# Patient Record
Sex: FEMALE | Race: WHITE | URBAN - METROPOLITAN AREA
[De-identification: names, ages, dates, MRNs, and addresses within clinical notes are randomized per-mention and may not be internally consistent; named-entity substitution may affect disease eponyms.]

---

## 2019-10-07 LAB — MAMMOGRAPHY, EXTERNAL: NORMAL

## 2019-11-17 NOTE — PROGRESS NOTES
History of Present Illness   Chief Complaint   Establish care    Cheng Espinal is a 81 y.o. female     Needed PCP closer to home. Jaw painpatient reports that she previously had this pain but in the past 2 weeks or so she has had worsening right-sided jaw pain. Feels like she cannot open her mouth fully. Difficulty with chewing sometimes. Recently traveled and was wondering if maybe it is actually an ear issue. Recently bought a mouthguard and has been using that and has noticed some mild improvement in her jaw pain. Feels like the pain is worse in the morning. Not sure if she grinds at night. Concerned about high blood pressurepatient has a lots of stressful situations going on this past year. Wondering if she should be taking her blood pressure at home. Denies any chest pain, shortness of breath, headache, lightheadedness, dizziness. Breast cancer 2-1/2 years out from remission. Followed by an oncologist in [place]. Initially diagnosed as stage II. Underwent chemo and radiation. Status post 2 lumpectomies. Denies any recent issues    Hyperlipidemiadenies any side effects to medications    Allergic rhinitistakes allergy shots, Claritin, Flonase    Asthmatakes level albuterol as needed. Denies any shortness of breath or wheezing. Reports symptoms are currently controlled. Depressiontaking Zoloft. Has been on medications for the past 25 years. Well-controlled at this time    Insomniahas been taking Ambien for the past 25 years as well    GERDwell controlled with Prilosec    Arthritistakes Mobic as needed    Review of Systems   Constitutional: Negative for chills and fever. HENT: Negative for hearing loss. Eyes: Negative for blurred vision. Respiratory: Negative for shortness of breath. Cardiovascular: Negative for chest pain. Gastrointestinal: Negative for abdominal pain, blood in stool, constipation, diarrhea, melena, nausea and vomiting.    Genitourinary: Negative for dysuria and hematuria. Musculoskeletal: Negative for joint pain. Skin: Negative for rash. Neurological: Negative for headaches. Past Medical History     Allergies   Allergen Reactions    Prednisone Anaphylaxis and Shortness of Breath     Other reaction(s): anaphylaxis/angioedema      Cefazolin Cough and Itching     Other reaction(s): cough  Other reaction(s): Itching  Other reaction(s): Itching      Other Medication Other (comments)     Other reaction(s): unknown  Increased liver function    Statins-Hmg-Coa Reductase Inhibitors Other (comments)     Muscle aches        Current Outpatient Medications   Medication Sig    exemestane (AROMASIN) 25 mg tablet Take 25 mg by mouth daily.  atorvastatin (LIPITOR) 10 mg tablet Take  by mouth daily.  Cetirizine (ZYRTEC) 10 mg cap Take  by mouth.  meloxicam (MOBIC) 15 mg tablet Take 15 mg by mouth daily.  sertraline (ZOLOFT) 50 mg tablet Take  by mouth daily.  zolpidem (AMBIEN) 5 mg tablet Take  by mouth nightly as needed for Sleep.  omeprazole (PRILOSEC) 20 mg capsule Take 20 mg by mouth daily.  levalbuterol tartrate (XOPENEX HFA IN) Take  by inhalation as needed.  nicotinic acid (NIACIN) 500 mg tablet Take 500 mg by mouth Daily (before breakfast).  krill/om-3/dha/epa/phospho/ast (MEGARED OMEGA-3 KRILL OIL PO) Take  by mouth.  acetylcysteine (N-ACETYL-L-CYSTEINE) Take 600 mg by mouth daily.  multivitamin (ONE A DAY) tablet Take 1 Tab by mouth daily.  calcium carbonate/vitamin D2 (LIQUID CALCIUM PO) Take  by mouth.  glucosamine-chondroitin (ARTHX) 500-400 mg cap Take 1 Cap by mouth daily.  cholecalciferol (VITAMIN D3) 1,000 unit cap Take  by mouth daily.  magnesium oxide (MAG-OX) 400 mg tablet Take 400 mg by mouth daily.  MILK THISTLE PO Take  by mouth.  BIOTIN PO Take  by mouth.  TURMERIC PO Take  by mouth. No current facility-administered medications for this visit.            There is no problem list on file for this patient. Past Surgical History:   Procedure Laterality Date    HX BREAST LUMPECTOMY      HX ORTHOPAEDIC      bilateral Thumbs    HX ORTHOPAEDIC      Left lower Leg with hardware    HX ORTHOPAEDIC      Ramirez Neuroma    HX TONSIL AND ADENOIDECTOMY      HX WISDOM TEETH EXTRACTION        Social History     Tobacco Use    Smoking status: Former Smoker     Packs/day: 1.50     Years: 23.00     Pack years: 34.50     Last attempt to quit:      Years since quittin.8    Smokeless tobacco: Never Used   Substance Use Topics    Alcohol use: Yes     Alcohol/week: 7.0 standard drinks     Types: 7 Cans of beer per week      Family History   Problem Relation Age of Onset    Heart Attack Mother 71        3V cabg    Diabetes Mother     Other Mother         cholesterol    Pancreatic Cancer Father 68    Diabetes Brother         complicated by leg amputation        Physical Exam   Vitals:       Visit Vitals  /78 (BP 1 Location: Left arm, BP Patient Position: Sitting)   Pulse 72   Temp 99 °F (37.2 °C) (Oral)   Resp 18   Ht 5' 2\" (1.575 m)   Wt 139 lb 2 oz (63.1 kg)   SpO2 97%   BMI 25.45 kg/m²        Physical Exam   Constitutional: She is oriented to person, place, and time and well-developed, well-nourished, and in no distress. No distress. HENT:   Right Ear: External ear normal.   Left Ear: External ear normal.   Mouth/Throat: Oropharynx is clear and moist.   Eyes: Conjunctivae and EOM are normal.   Neck: Neck supple. No thyromegaly present. Cardiovascular: Normal rate, regular rhythm and intact distal pulses. Exam reveals no gallop and no friction rub. No murmur heard. Pulmonary/Chest: No respiratory distress. She has no wheezes. She has no rales. Abdominal: Soft. Bowel sounds are normal. She exhibits no distension. There is no hepatosplenomegaly. There is no tenderness. Neurological: She is alert and oriented to person, place, and time. Skin: Skin is warm.  No rash noted. Psychiatric: Affect and judgment normal.        Assessment and Plan   Diagnoses and all orders for this visit:    1. Routine adult health maintenance  -     CBC WITH AUTOMATED DIFF; Future  -     HEMOGLOBIN A1C WITH EAG; Future  -     METABOLIC PANEL, COMPREHENSIVE; Future  -     LIPID PANEL; Future  -     HEPATITIS C AB; Future  Patient to see if she had a pneumonia shot already    2. Mild intermittent asthma without complication  Well-controlled    3. Arthritis  Well-controlled    4. Hypercholesterolemia  Check lipid panel today    5. History of breast cancer  Followed by oncology in [place]    6. Recurrent major depressive disorder, in remission [facility])  Assessment & Plan:    Key Psychotherapeutic Meds             sertraline (ZOLOFT) 50 mg tablet (Taking) Take  by mouth daily. zolpidem (AMBIEN) 5 mg tablet (Taking) Take  by mouth nightly as needed for Sleep. Other [address] [place] [address]     The patient is on no other behavioral health meds. No results found for: NA, NAPOC, CREA, CREAPOC, CREATEXT, TSH, WBC, WBCT, WBCPOC, GPT, ALTPOC, ALT, SGOT, ASTPOC, GGT, LITHM, ATRPA, NORTR, TSHEXT  Well-controlled, continue medications    7. Gastroesophageal reflux disease without esophagitis  Well-controlled, continue medications    8. Chronic rhinitis  Well-controlled, continue medications    9. Other insomnia  Well-controlled, continue medications    10. Jaw pain  Ear exam negative. Possible TMJ due to grinding at night? Recommend continuing mouthguard and following up with her dentist as scheduled in January    Benefits, risks, possible drug interactions, and side effects of all new medications were reviewed with the patient. Pt verbalized understanding. Return to clinic: 3 months for jaw, blood pressure (recommended taking her blood pressure at home)    Elise Pisano MD  [facility]  11/18/2019    No future appointments.

## 2019-11-18 ENCOUNTER — HOSPITAL ENCOUNTER (OUTPATIENT)
Dept: LAB | Age: 69
Discharge: HOME | End: 2019-11-18

## 2019-11-18 ENCOUNTER — OFFICE VISIT (OUTPATIENT)
Dept: INTERNAL MEDICINE CLINIC | Age: 69
End: 2019-11-18

## 2019-11-18 VITALS
WEIGHT: 139.13 LBS | TEMPERATURE: 99 F | HEART RATE: 72 BPM | OXYGEN SATURATION: 97 % | HEIGHT: 62 IN | SYSTOLIC BLOOD PRESSURE: 133 MMHG | BODY MASS INDEX: 25.6 KG/M2 | RESPIRATION RATE: 18 BRPM | DIASTOLIC BLOOD PRESSURE: 78 MMHG

## 2019-11-18 DIAGNOSIS — K21.9 GASTROESOPHAGEAL REFLUX DISEASE WITHOUT ESOPHAGITIS: ICD-10-CM

## 2019-11-18 DIAGNOSIS — E78.00 HYPERCHOLESTEROLEMIA: ICD-10-CM

## 2019-11-18 DIAGNOSIS — M19.90 ARTHRITIS: ICD-10-CM

## 2019-11-18 DIAGNOSIS — Z85.3 HISTORY OF BREAST CANCER: ICD-10-CM

## 2019-11-18 DIAGNOSIS — G47.09 OTHER INSOMNIA: ICD-10-CM

## 2019-11-18 DIAGNOSIS — Z00.00 ROUTINE ADULT HEALTH MAINTENANCE: ICD-10-CM

## 2019-11-18 DIAGNOSIS — J45.20 MILD INTERMITTENT ASTHMA WITHOUT COMPLICATION: ICD-10-CM

## 2019-11-18 DIAGNOSIS — Z23 ENCOUNTER FOR IMMUNIZATION: ICD-10-CM

## 2019-11-18 DIAGNOSIS — J31.0 CHRONIC RHINITIS: ICD-10-CM

## 2019-11-18 DIAGNOSIS — F33.40 RECURRENT MAJOR DEPRESSIVE DISORDER, IN REMISSION (HCC): ICD-10-CM

## 2019-11-18 DIAGNOSIS — R68.84 JAW PAIN: ICD-10-CM

## 2019-11-18 DIAGNOSIS — Z00.00 ROUTINE ADULT HEALTH MAINTENANCE: Primary | ICD-10-CM

## 2019-11-18 PROBLEM — F32.A DEPRESSION: Status: ACTIVE | Noted: 2019-11-18

## 2019-11-18 PROBLEM — J45.909 ASTHMA: Status: ACTIVE | Noted: 2019-11-18

## 2019-11-18 LAB
ALBUMIN SERPL-MCNC: 4 G/DL (ref 3.5–5)
ALBUMIN/GLOB SERPL: 1.5 {RATIO} (ref 1.1–2.2)
ALP SERPL-CCNC: 82 U/L (ref 45–117)
ALT SERPL-CCNC: 30 U/L (ref 12–78)
ANION GAP SERPL CALC-SCNC: 8 MMOL/L (ref 5–15)
AST SERPL-CCNC: 18 U/L (ref 15–37)
BASOPHILS # BLD: 0 K/UL (ref 0–0.1)
BASOPHILS NFR BLD: 1 % (ref 0–1)
BILIRUB SERPL-MCNC: 0.2 MG/DL (ref 0.2–1)
BUN SERPL-MCNC: 16 MG/DL (ref 6–20)
BUN/CREAT SERPL: 23 (ref 12–20)
CALCIUM SERPL-MCNC: 9.2 MG/DL (ref 8.5–10.1)
CHLORIDE SERPL-SCNC: 109 MMOL/L (ref 97–108)
CHOLEST SERPL-MCNC: 196 MG/DL
CO2 SERPL-SCNC: 24 MMOL/L (ref 21–32)
CREAT SERPL-MCNC: 0.7 MG/DL (ref 0.55–1.02)
DIFFERENTIAL METHOD BLD: NORMAL
EOSINOPHIL # BLD: 0.2 K/UL (ref 0–0.4)
EOSINOPHIL NFR BLD: 3 % (ref 0–7)
ERYTHROCYTE [DISTWIDTH] IN BLOOD BY AUTOMATED COUNT: 13 % (ref 11.5–14.5)
GLOBULIN SER CALC-MCNC: 2.7 G/DL (ref 2–4)
GLUCOSE SERPL-MCNC: 114 MG/DL (ref 65–100)
HCT VFR BLD AUTO: 42.4 % (ref 35–47)
HDLC SERPL-MCNC: 69 MG/DL
HDLC SERPL: 2.8 {RATIO} (ref 0–5)
HGB BLD-MCNC: 13.4 G/DL (ref 11.5–16)
IMM GRANULOCYTES # BLD AUTO: 0 K/UL (ref 0–0.04)
IMM GRANULOCYTES NFR BLD AUTO: 0 % (ref 0–0.5)
LDLC SERPL CALC-MCNC: 107.6 MG/DL (ref 0–100)
LIPID PROFILE,FLP: ABNORMAL
LYMPHOCYTES # BLD: 1.1 K/UL (ref 0.8–3.5)
LYMPHOCYTES NFR BLD: 17 % (ref 12–49)
MCH RBC QN AUTO: 29.7 PG (ref 26–34)
MCHC RBC AUTO-ENTMCNC: 31.6 G/DL (ref 30–36.5)
MCV RBC AUTO: 94 FL (ref 80–99)
MONOCYTES # BLD: 0.5 K/UL (ref 0–1)
MONOCYTES NFR BLD: 8 % (ref 5–13)
NEUTS SEG # BLD: 4.5 K/UL (ref 1.8–8)
NEUTS SEG NFR BLD: 71 % (ref 32–75)
NRBC # BLD: 0 K/UL (ref 0–0.01)
NRBC BLD-RTO: 0 PER 100 WBC
PLATELET # BLD AUTO: 166 K/UL (ref 150–400)
POTASSIUM SERPL-SCNC: 4 MMOL/L (ref 3.5–5.1)
PROT SERPL-MCNC: 6.7 G/DL (ref 6.4–8.2)
RBC # BLD AUTO: 4.51 M/UL (ref 3.8–5.2)
SODIUM SERPL-SCNC: 141 MMOL/L (ref 136–145)
TRIGL SERPL-MCNC: 97 MG/DL (ref ?–150)
VLDLC SERPL CALC-MCNC: 19.4 MG/DL
WBC # BLD AUTO: 6.3 K/UL (ref 3.6–11)

## 2019-11-18 RX ORDER — LORATADINE 10 MG/1
10 TABLET ORAL
COMMUNITY
End: 2020-03-20

## 2019-11-18 RX ORDER — ATORVASTATIN CALCIUM 10 MG/1
TABLET, FILM COATED ORAL DAILY
COMMUNITY
End: 2020-03-20 | Stop reason: SDUPTHER

## 2019-11-18 RX ORDER — SERTRALINE HYDROCHLORIDE 50 MG/1
TABLET, FILM COATED ORAL DAILY
COMMUNITY
End: 2021-02-01 | Stop reason: SDUPTHER

## 2019-11-18 RX ORDER — EXEMESTANE 25 MG/1
25 TABLET ORAL DAILY
COMMUNITY

## 2019-11-18 RX ORDER — MELOXICAM 15 MG/1
15 TABLET ORAL DAILY
COMMUNITY
End: 2020-03-20 | Stop reason: SDUPTHER

## 2019-11-18 RX ORDER — BISMUTH SUBSALICYLATE 262 MG
1 TABLET,CHEWABLE ORAL DAILY
COMMUNITY

## 2019-11-18 RX ORDER — GLUCOSAMINE SULFATE 1500 MG
POWDER IN PACKET (EA) ORAL DAILY
COMMUNITY

## 2019-11-18 RX ORDER — GLUCOSAMINE/CHONDR SU A SOD 167-133 MG
500 CAPSULE ORAL 2 TIMES DAILY WITH MEALS
COMMUNITY

## 2019-11-18 RX ORDER — FLUTICASONE PROPIONATE 50 MCG
2 SPRAY, SUSPENSION (ML) NASAL DAILY
COMMUNITY
End: 2020-10-07 | Stop reason: SDUPTHER

## 2019-11-18 RX ORDER — ZOLPIDEM TARTRATE 5 MG/1
TABLET ORAL
COMMUNITY
End: 2020-01-24 | Stop reason: SDUPTHER

## 2019-11-18 RX ORDER — LANOLIN ALCOHOL/MO/W.PET/CERES
1 CREAM (GRAM) TOPICAL DAILY
COMMUNITY

## 2019-11-18 RX ORDER — LANOLIN ALCOHOL/MO/W.PET/CERES
400 CREAM (GRAM) TOPICAL DAILY
COMMUNITY

## 2019-11-18 RX ORDER — OMEPRAZOLE 20 MG/1
20 CAPSULE, DELAYED RELEASE ORAL DAILY
COMMUNITY
End: 2020-10-27

## 2019-11-18 NOTE — ASSESSMENT & PLAN NOTE
Key Psychotherapeutic Meds             sertraline (ZOLOFT) 50 mg tablet (Taking) Take  by mouth daily. zolpidem (AMBIEN) 5 mg tablet (Taking) Take  by mouth nightly as needed for Sleep. Other [address] [facility]     The patient is on no other behavioral health meds.         No results found for: NA, NAPOC, CREA, CREAPOC, CREATEXT, TSH, WBC, WBCT, WBCPOC, GPT, ALTPOC, ALT, SGOT, ASTPOC, GGT, LITHM, ATRPA, NORTR, TSHEXT

## 2019-11-18 NOTE — PROGRESS NOTES
Norbert Mohs is a 81 y.o. female who presents for routine immunizations. She denies any symptoms , reactions or allergies that would exclude them from being immunized today. Risks and adverse reactions were discussed and the VIS was given to them. All questions were addressed. There were no reactions observed.     Dionisio Groves LPN

## 2019-11-19 LAB
EST. AVERAGE GLUCOSE BLD GHB EST-MCNC: 131 MG/DL
HBA1C MFR BLD: 6.2 % (ref 4–5.6)
HCV AB SERPL QL IA: NONREACTIVE
HCV COMMENT,HCGAC: NORMAL

## 2020-01-23 ENCOUNTER — TELEPHONE (OUTPATIENT)
Dept: INTERNAL MEDICINE CLINIC | Age: 70
End: 2020-01-23

## 2020-01-24 DIAGNOSIS — G47.00 INSOMNIA, UNSPECIFIED TYPE: Primary | ICD-10-CM

## 2020-01-24 RX ORDER — ZOLPIDEM TARTRATE 5 MG/1
5 TABLET ORAL
Qty: 30 TAB | Refills: 1 | Status: SHIPPED | OUTPATIENT
Start: 2020-01-24 | End: 2020-02-05 | Stop reason: SDUPTHER

## 2020-02-05 DIAGNOSIS — G47.00 INSOMNIA, UNSPECIFIED TYPE: ICD-10-CM

## 2020-02-05 RX ORDER — ZOLPIDEM TARTRATE 5 MG/1
5 TABLET ORAL
Qty: 90 TAB | Refills: 1 | Status: SHIPPED | COMMUNITY
Start: 2020-02-05 | End: 2020-07-24 | Stop reason: SDUPTHER

## 2020-02-05 NOTE — TELEPHONE ENCOUNTER
Dr. Verónica Garces Refill   Received: Today   Message Contents   [facility], Ernestina Harvey sent to [facility]  Phone Number: [phone]         Caller (if not patient): N/A   Relationship of caller (if not patient): N/A   Best contact number(s): [phone]   Name of medication and dosage if known: \" Zolpidem\" 5mg   Is patient out of this medication (yes/no):  No   Pharmacy name: [facility] [id number] listed in chart? (yes/no): Yes   Pharmacy phone number: [phone]   Date of last visit: Monday, November 18, 2019   Details to clarify the request: N/A

## 2020-03-20 RX ORDER — ATORVASTATIN CALCIUM 10 MG/1
10 TABLET, FILM COATED ORAL DAILY
Qty: 90 TAB | Refills: 3 | Status: SHIPPED | OUTPATIENT
Start: 2020-03-20 | End: 2021-05-11

## 2020-03-20 RX ORDER — CETIRIZINE HCL 10 MG
10 TABLET ORAL
Qty: 90 TAB | Refills: 3 | Status: SHIPPED | OUTPATIENT
Start: 2020-03-20 | End: 2021-02-18

## 2020-03-20 RX ORDER — MELOXICAM 15 MG/1
15 TABLET ORAL DAILY
Qty: 90 TAB | Refills: 1 | Status: SHIPPED | OUTPATIENT
Start: 2020-03-20 | End: 2020-08-31

## 2020-07-24 DIAGNOSIS — G47.00 INSOMNIA, UNSPECIFIED TYPE: ICD-10-CM

## 2020-07-24 RX ORDER — ZOLPIDEM TARTRATE 5 MG/1
5 TABLET ORAL
Qty: 90 TAB | Refills: 1 | Status: SHIPPED | OUTPATIENT
Start: 2020-07-24 | End: 2020-10-22

## 2020-08-31 RX ORDER — MELOXICAM 15 MG/1
TABLET ORAL
Qty: 90 TAB | Refills: 3 | Status: SHIPPED | OUTPATIENT
Start: 2020-08-31 | End: 2021-08-17

## 2020-10-07 DIAGNOSIS — Z00.00 ROUTINE ADULT HEALTH MAINTENANCE: Primary | ICD-10-CM

## 2020-10-07 RX ORDER — FLUTICASONE PROPIONATE 50 MCG
2 SPRAY, SUSPENSION (ML) NASAL DAILY
Qty: 3 BOTTLE | Refills: 3 | Status: SHIPPED | OUTPATIENT
Start: 2020-10-07 | End: 2021-09-29

## 2020-10-19 ENCOUNTER — APPOINTMENT (OUTPATIENT)
Dept: INTERNAL MEDICINE CLINIC | Age: 70
End: 2020-10-19

## 2020-10-19 DIAGNOSIS — Z00.00 ROUTINE ADULT HEALTH MAINTENANCE: ICD-10-CM

## 2020-10-19 LAB
ALBUMIN SERPL-MCNC: 4.1 G/DL (ref 3.5–5)
ALBUMIN/GLOB SERPL: 1.6 {RATIO} (ref 1.1–2.2)
ALP SERPL-CCNC: 80 U/L (ref 45–117)
ALT SERPL-CCNC: 36 U/L (ref 12–78)
ANION GAP SERPL CALC-SCNC: 6 MMOL/L (ref 5–15)
AST SERPL-CCNC: 22 U/L (ref 15–37)
BASOPHILS # BLD: 0 K/UL (ref 0–0.1)
BASOPHILS NFR BLD: 0 % (ref 0–1)
BILIRUB SERPL-MCNC: 0.4 MG/DL (ref 0.2–1)
BUN SERPL-MCNC: 10 MG/DL (ref 6–20)
BUN/CREAT SERPL: 14 (ref 12–20)
CALCIUM SERPL-MCNC: 9.3 MG/DL (ref 8.5–10.1)
CHLORIDE SERPL-SCNC: 107 MMOL/L (ref 97–108)
CHOLEST SERPL-MCNC: 181 MG/DL
CO2 SERPL-SCNC: 29 MMOL/L (ref 21–32)
CREAT SERPL-MCNC: 0.73 MG/DL (ref 0.55–1.02)
DIFFERENTIAL METHOD BLD: NORMAL
EOSINOPHIL # BLD: 0.3 K/UL (ref 0–0.4)
EOSINOPHIL NFR BLD: 6 % (ref 0–7)
ERYTHROCYTE [DISTWIDTH] IN BLOOD BY AUTOMATED COUNT: 12.4 % (ref 11.5–14.5)
GLOBULIN SER CALC-MCNC: 2.6 G/DL (ref 2–4)
GLUCOSE SERPL-MCNC: 92 MG/DL (ref 65–100)
HCT VFR BLD AUTO: 42.9 % (ref 35–47)
HDLC SERPL-MCNC: 54 MG/DL
HDLC SERPL: 3.4 {RATIO} (ref 0–5)
HGB BLD-MCNC: 13.6 G/DL (ref 11.5–16)
IMM GRANULOCYTES # BLD AUTO: 0 K/UL (ref 0–0.04)
IMM GRANULOCYTES NFR BLD AUTO: 0 % (ref 0–0.5)
LDLC SERPL CALC-MCNC: 100.6 MG/DL (ref 0–100)
LIPID PROFILE,FLP: ABNORMAL
LYMPHOCYTES # BLD: 1.1 K/UL (ref 0.8–3.5)
LYMPHOCYTES NFR BLD: 26 % (ref 12–49)
MCH RBC QN AUTO: 30 PG (ref 26–34)
MCHC RBC AUTO-ENTMCNC: 31.7 G/DL (ref 30–36.5)
MCV RBC AUTO: 94.5 FL (ref 80–99)
MONOCYTES # BLD: 0.4 K/UL (ref 0–1)
MONOCYTES NFR BLD: 11 % (ref 5–13)
NEUTS SEG # BLD: 2.3 K/UL (ref 1.8–8)
NEUTS SEG NFR BLD: 57 % (ref 32–75)
NRBC # BLD: 0 K/UL (ref 0–0.01)
NRBC BLD-RTO: 0 PER 100 WBC
PLATELET # BLD AUTO: 157 K/UL (ref 150–400)
POTASSIUM SERPL-SCNC: 4.4 MMOL/L (ref 3.5–5.1)
PROT SERPL-MCNC: 6.7 G/DL (ref 6.4–8.2)
RBC # BLD AUTO: 4.54 M/UL (ref 3.8–5.2)
SODIUM SERPL-SCNC: 142 MMOL/L (ref 136–145)
TRIGL SERPL-MCNC: 132 MG/DL (ref ?–150)
VLDLC SERPL CALC-MCNC: 26.4 MG/DL
WBC # BLD AUTO: 4.1 K/UL (ref 3.6–11)

## 2020-10-20 LAB
EST. AVERAGE GLUCOSE BLD GHB EST-MCNC: 114 MG/DL
HBA1C MFR BLD: 5.6 % (ref 4–5.6)

## 2020-10-26 NOTE — PROGRESS NOTES
Assessment and Plan   Diagnoses and all orders for this visit:    1. Medicare annual wellness visit, subsequent  Patient reports she tested negative for VZV so does not need the shingles vaccine. Discussed getting the Pneumovax. She is scheduled to get her mammogram next month with Dr. Sandra Rodriguez with [facility]. Give her our fax number so we can get a copy    2. Other insomnia  Well-controlled. Continue Ambien and melatonin    3. Hypercholesterolemia  Well-controlled. Continue atorvastatin    4. Recurrent major depressive disorder, in remission ([address]  Well-controlled. Continue sertraline     a1c better 5.6 from 6.2. LDL stable 100.6 from 107. CMP and CBC nl    Benefits, risks, possible drug interactions, and side effects of all new medications were reviewed with the patient. Pt verbalized understanding. Return to clinic: 1 year for physical or earlier as needed    Kinsey Palomo MD  [facility]  10/27/2020    No future appointments. Subjective   Chief Complaint   Medicare wellness    Marlan Heimlich is a 78 y.o. female         Review of Systems   Constitutional: Negative for chills and fever. HENT: Negative for hearing loss. Eyes: Negative for blurred vision. Respiratory: Negative for shortness of breath. Cardiovascular: Negative for chest pain. Gastrointestinal: Negative for abdominal pain, blood in stool, constipation, diarrhea, melena, nausea and vomiting. Genitourinary: Negative for dysuria and hematuria. Musculoskeletal: Negative for joint pain. Skin: Negative for rash. Neurological: Negative for headaches. Objective   Vitals:       Visit Vitals  /79 (BP 1 Location: Left arm, BP Patient Position: Sitting)   Pulse 65   Temp 98.6 °F (37 °C) (Oral)   Resp 14   Ht 5' 2\" (1.575 m)   Wt 134 lb 6.4 oz (61 kg)   SpO2 97%   BMI 24.58 kg/m²        Physical Exam  Constitutional:       General: She is not in acute distress. Appearance: She is well-developed. HENT:      Right Ear: Tympanic membrane, ear canal and external ear normal.      Left Ear: Tympanic membrane, ear canal and external ear normal.   Eyes:      Extraocular Movements: Extraocular movements intact. Conjunctiva/sclera: Conjunctivae normal.   Neck:      Musculoskeletal: Neck supple. Cardiovascular:      Rate and Rhythm: Normal rate and regular rhythm. Pulses: Normal pulses. Heart sounds: No murmur. No friction rub. No gallop. Pulmonary:      Effort: No respiratory distress. Breath sounds: No wheezing, rhonchi or rales. Abdominal:      General: Bowel sounds are normal. There is no distension. Palpations: Abdomen is soft. There is no hepatomegaly, splenomegaly or mass. Tenderness: There is no abdominal tenderness. There is no guarding. Skin:     General: Skin is warm. Findings: No rash. Neurological:      Mental Status: She is alert. Current Outpatient Medications   Medication Sig    zolpidem (AMBIEN) 5 mg tablet Take 5 mg by mouth nightly as needed.  melatonin 10 mg capsule Take  by mouth nightly.  fluticasone propionate (FLONASE) 50 mcg/actuation nasal spray 2 Sprays by Both Nostrils route daily. Indications: inflammation of the nose due to an allergy    meloxicam (MOBIC) 15 mg tablet TAKE 1 TABLET DAILY    atorvastatin (LIPITOR) 10 mg tablet Take 1 Tab by mouth daily.  cetirizine (ZYRTEC) 10 mg tablet Take 1 Tab by mouth daily as needed for Allergies.  exemestane (AROMASIN) 25 mg tablet Take 25 mg by mouth daily.  sertraline (ZOLOFT) 50 mg tablet Take  by mouth daily.  levalbuterol tartrate (XOPENEX HFA IN) Take  by inhalation as needed.  nicotinic acid (NIACIN) 500 mg tablet Take 500 mg by mouth two (2) times daily (with meals).  krill/om-3/dha/epa/phospho/ast (MEGARED OMEGA-3 KRILL OIL PO) Take  by mouth.  acetylcysteine (N-ACETYL-L-CYSTEINE) Take 600 mg by mouth daily.     multivitamin (ONE A DAY) tablet Take 1 Tab by mouth daily.  calcium carbonate/vitamin D2 (LIQUID CALCIUM PO) Take  by mouth.  glucosamine-chondroitin (ARTHX) 500-400 mg cap Take 1 Cap by mouth daily.  cholecalciferol (VITAMIN D3) 1,000 unit cap Take  by mouth daily.  MILK THISTLE PO Take  by mouth.  BIOTIN PO Take  by mouth.  TURMERIC PO Take  by mouth.  magnesium oxide (MAG-OX) 400 mg tablet Take 400 mg by mouth daily. Indications: not taking     No current facility-administered medications for this visit. This is the Subsequent Medicare Annual Wellness Exam, performed 12 months or more after the Initial AWV or the last Subsequent AWV    I have reviewed the patient's medical history in detail and updated the computerized patient record. History     Patient Active Problem List   Diagnosis Code    Mild intermittent asthma without complication V96.49    Arthritis M19.90    Hypercholesterolemia E78.00    History of breast cancer Z85.3    Recurrent major depressive disorder, in remission ([facility] [address] F33.40    Chronic rhinitis J31.0    Other insomnia G47.09    Gastroesophageal reflux disease without esophagitis K21.9     Past Medical History:   Diagnosis Date    Arthritis     Hypercholesterolemia       Past Surgical History:   Procedure Laterality Date    HX BREAST LUMPECTOMY      HX ORTHOPAEDIC      bilateral Thumbs    HX ORTHOPAEDIC      Left lower Leg with hardware    HX ORTHOPAEDIC      Ramirez Neuroma    HX TONSIL AND ADENOIDECTOMY      HX WISDOM TEETH EXTRACTION       Current Outpatient Medications   Medication Sig Dispense Refill    zolpidem (AMBIEN) 5 mg tablet Take 5 mg by mouth nightly as needed.  melatonin 10 mg capsule Take  by mouth nightly.  fluticasone propionate (FLONASE) 50 mcg/actuation nasal spray 2 Sprays by Both Nostrils route daily.  Indications: inflammation of the nose due to an allergy 3 Bottle 3    meloxicam (MOBIC) 15 mg tablet TAKE 1 TABLET DAILY 90 Tab 3    atorvastatin (LIPITOR) 10 mg tablet Take 1 Tab by mouth daily. 90 Tab 3    cetirizine (ZYRTEC) 10 mg tablet Take 1 Tab by mouth daily as needed for Allergies. 90 Tab 3    exemestane (AROMASIN) 25 mg tablet Take 25 mg by mouth daily.  sertraline (ZOLOFT) 50 mg tablet Take  by mouth daily.  levalbuterol tartrate (XOPENEX HFA IN) Take  by inhalation as needed.  nicotinic acid (NIACIN) 500 mg tablet Take 500 mg by mouth two (2) times daily (with meals).  krill/om-3/dha/epa/phospho/ast (MEGARED OMEGA-3 KRILL OIL PO) Take  by mouth.  acetylcysteine (N-ACETYL-L-CYSTEINE) Take 600 mg by mouth daily.  multivitamin (ONE A DAY) tablet Take 1 Tab by mouth daily.  calcium carbonate/vitamin D2 (LIQUID CALCIUM PO) Take  by mouth.  glucosamine-chondroitin (ARTHX) 500-400 mg cap Take 1 Cap by mouth daily.  cholecalciferol (VITAMIN D3) 1,000 unit cap Take  by mouth daily.  MILK THISTLE PO Take  by mouth.  BIOTIN PO Take  by mouth.  TURMERIC PO Take  by mouth.  magnesium oxide (MAG-OX) 400 mg tablet Take 400 mg by mouth daily.  Indications: not taking       Allergies   Allergen Reactions    Prednisone Anaphylaxis and Shortness of Breath     Other reaction(s): anaphylaxis/angioedema      Cefazolin Cough and Itching     Other reaction(s): cough  Other reaction(s): Itching  Other reaction(s): Itching      Other Medication Other (comments)     Other reaction(s): unknown  Increased liver function    Statins-Hmg-Coa Reductase Inhibitors Other (comments)     Muscle aches       Family History   Problem Relation Age of Onset    Heart Attack Mother 71        3V cabg    Diabetes Mother     Other Mother         cholesterol    Pancreatic Cancer Father 68    Diabetes Brother         complicated by leg amputation     Social History     Tobacco Use    Smoking status: Former Smoker     Packs/day: 1.50     Years: 23.00     Pack years: 34.50     Last attempt to quit: 1992     Years since quittin.8    Smokeless tobacco: Never Used   Substance Use Topics    Alcohol use: Yes     Alcohol/week: 7.0 standard drinks     Types: 7 Cans of beer per week       Depression Risk Factor Screening:     3 most recent PHQ Screens 10/27/2020   Little interest or pleasure in doing things Not at all   Feeling down, depressed, irritable, or hopeless Not at all   Total Score PHQ 2 0       Alcohol Risk Screen   Do you average more than 1 drink per night or more than 7 drinks a week:  No    On any one occasion in the past three months have you have had more than 3 drinks containing alcohol:  No        Functional Ability and Level of Safety:   Hearing: Hearing is good. Activities of Daily Living: The home contains: grab bars, rugs and safety discussed  Patient does total self care     Ambulation: with no difficulty     Fall Risk:  Fall Risk Assessment, last 12 mths 2019   Able to walk? Yes   Fall in past 12 months? Yes   Fall with injury? Yes   Number of falls in past 12 months 1   Fall Risk Score 2     Abuse Screen:  Patient is not abused       Cognitive Screening   Has your family/caregiver stated any concerns about your memory: yes - still feeling some effect from chemo, \"not as good as it should be\", does brain exercises, not affectin gdaily life        Patient Care Team   Patient Care Team:  Mary Rubin MD as PCP - General (Internal Medicine)  Mary Rubin MD as PCP - [facility] Empaneled Provider    Assessment/Plan   Education and counseling provided:  Are appropriate based on today's review and evaluation    Diagnoses and all orders for this visit:    1. Medicare annual wellness visit, subsequent    2. Other insomnia    3. Hypercholesterolemia    4.  Recurrent major depressive disorder, in remission [facility])        Health Maintenance Due   Topic Date Due    Pneumococcal 65+ years (2 of 2 - PPSV23) 2019    Breast Cancer Screen Mammogram  10/07/2020

## 2020-10-27 ENCOUNTER — OFFICE VISIT (OUTPATIENT)
Dept: INTERNAL MEDICINE CLINIC | Age: 70
End: 2020-10-27
Payer: MEDICARE

## 2020-10-27 VITALS
SYSTOLIC BLOOD PRESSURE: 126 MMHG | TEMPERATURE: 98.6 F | OXYGEN SATURATION: 97 % | HEART RATE: 65 BPM | BODY MASS INDEX: 24.73 KG/M2 | WEIGHT: 134.4 LBS | RESPIRATION RATE: 14 BRPM | HEIGHT: 62 IN | DIASTOLIC BLOOD PRESSURE: 79 MMHG

## 2020-10-27 DIAGNOSIS — E78.00 HYPERCHOLESTEROLEMIA: ICD-10-CM

## 2020-10-27 DIAGNOSIS — F33.40 RECURRENT MAJOR DEPRESSIVE DISORDER, IN REMISSION (HCC): ICD-10-CM

## 2020-10-27 DIAGNOSIS — Z00.00 MEDICARE ANNUAL WELLNESS VISIT, SUBSEQUENT: Primary | ICD-10-CM

## 2020-10-27 DIAGNOSIS — G47.09 OTHER INSOMNIA: ICD-10-CM

## 2020-10-27 PROCEDURE — 1100F PTFALLS ASSESS-DOCD GE2>/YR: CPT | Performed by: INTERNAL MEDICINE

## 2020-10-27 PROCEDURE — 3288F FALL RISK ASSESSMENT DOCD: CPT | Performed by: INTERNAL MEDICINE

## 2020-10-27 PROCEDURE — G8420 CALC BMI NORM PARAMETERS: HCPCS | Performed by: INTERNAL MEDICINE

## 2020-10-27 PROCEDURE — G9899 SCRN MAM PERF RSLTS DOC: HCPCS | Performed by: INTERNAL MEDICINE

## 2020-10-27 PROCEDURE — G9717 DOC PT DX DEP/BP F/U NT REQ: HCPCS | Performed by: INTERNAL MEDICINE

## 2020-10-27 PROCEDURE — G8536 NO DOC ELDER MAL SCRN: HCPCS | Performed by: INTERNAL MEDICINE

## 2020-10-27 PROCEDURE — G8427 DOCREV CUR MEDS BY ELIG CLIN: HCPCS | Performed by: INTERNAL MEDICINE

## 2020-10-27 PROCEDURE — 3017F COLORECTAL CA SCREEN DOC REV: CPT | Performed by: INTERNAL MEDICINE

## 2020-10-27 PROCEDURE — G8399 PT W/DXA RESULTS DOCUMENT: HCPCS | Performed by: INTERNAL MEDICINE

## 2020-10-27 PROCEDURE — G0439 PPPS, SUBSEQ VISIT: HCPCS | Performed by: INTERNAL MEDICINE

## 2020-10-27 RX ORDER — ZOLPIDEM TARTRATE 5 MG/1
5 TABLET ORAL
COMMUNITY
End: 2021-01-18 | Stop reason: SDUPTHER

## 2020-10-27 RX ORDER — MELATONIN 10 MG
CAPSULE ORAL
COMMUNITY

## 2020-10-27 NOTE — PATIENT INSTRUCTIONS
Medicare Wellness Visit, Female     The best way to live healthy is to have a lifestyle where you eat a well-balanced diet, exercise regularly, limit alcohol use, and quit all forms of tobacco/nicotine, if applicable. Regular preventive services are another way to keep healthy. Preventive services (vaccines, screening tests, monitoring & exams) can help personalize your care plan, which helps you manage your own care. Screening tests can find health problems at the earliest stages, when they are easiest to treat. [facility] follows the current, evidence-based guidelines published by the [facility] ([facility]) when recommending preventive services for our patients. Because we follow these guidelines, sometimes recommendations change over time as research supports it. (For example, mammograms used to be recommended annually. Even though Medicare will still pay for an annual mammogram, the newer guidelines recommend a mammogram every two years for women of average risk). Of course, you and your doctor may decide to screen more often for some diseases, based on your risk and your co-morbidities (chronic disease you are already diagnosed with). Preventive services for you include:  - Medicare offers their members a free annual wellness visit, which is time for you and your primary care provider to discuss and plan for your preventive service needs. Take advantage of this benefit every year!  -All adults over the age of 72 should receive the recommended pneumonia vaccines. Current USPSTF guidelines recommend a series of two vaccines for the best pneumonia protection.   -All adults should have a flu vaccine yearly and a tetanus vaccine every 10 years.   -All adults age 48 and older should receive the shingles vaccines (series of two vaccines).       -All adults age 38-68 who are overweight should have a diabetes screening test once every three years.   -All adults born between [birth year] and 1963 should be screened once for Hepatitis C.  -Other screening tests and preventive services for persons with diabetes include: an eye exam to screen for diabetic retinopathy, a kidney function test, a foot exam, and stricter control over your cholesterol.   -Cardiovascular screening for adults with routine risk involves an electrocardiogram (ECG) at intervals determined by your doctor.   -Colorectal cancer screenings should be done for adults age 54-65 with no increased risk factors for colorectal cancer. There are a number of acceptable methods of screening for this type of cancer. Each test has its own benefits and drawbacks. Discuss with your doctor what is most appropriate for you during your annual wellness visit. The different tests include: colonoscopy (considered the best screening method), a fecal occult blood test, a fecal DNA test, and sigmoidoscopy.    -A bone mass density test is recommended when a woman turns 65 to screen for osteoporosis. This test is only recommended one time, as a screening. Some providers will use this same test as a disease monitoring tool if you already have osteoporosis. -Breast cancer screenings are recommended every other year for women of normal risk, age 54-69.  -Cervical cancer screenings for women over age 72 are only recommended with certain risk factors.      Here is a list of your current Health Maintenance items (your personalized list of preventive services) with a due date:  Health Maintenance Due   Topic Date Due    Shingles Vaccine (1 of 2) 09/08/2002    Glaucoma Screening   09/08/2017    Annual Well Visit  11/15/2019    Pneumococcal Vaccine (2 of 2 - PPSV23) 11/29/2019    Yearly Flu Vaccine (1) 09/01/2020    Mammogram  10/07/2020

## 2020-11-24 ENCOUNTER — PATIENT MESSAGE (OUTPATIENT)
Dept: INTERNAL MEDICINE CLINIC | Age: 70
End: 2020-11-24

## 2020-12-04 NOTE — TELEPHONE ENCOUNTER
Per PCP, medical record request sent via [address] [place] [address] Function to Medical Records for [facility] for patient's most recent colonoscopy & applicable pathology report.   [facility] [employer] Electronic [employer]outing Function fax results report shows a successful transmission of the medical record request.

## 2020-12-14 ENCOUNTER — PATIENT MESSAGE (OUTPATIENT)
Dept: INTERNAL MEDICINE CLINIC | Age: 70
End: 2020-12-14

## 2021-01-17 ENCOUNTER — PATIENT MESSAGE (OUTPATIENT)
Dept: INTERNAL MEDICINE CLINIC | Age: 71
End: 2021-01-17

## 2021-01-17 DIAGNOSIS — G47.09 OTHER INSOMNIA: Primary | ICD-10-CM

## 2021-01-18 DIAGNOSIS — G47.09 OTHER INSOMNIA: ICD-10-CM

## 2021-01-18 RX ORDER — ZOLPIDEM TARTRATE 5 MG/1
5 TABLET ORAL
Qty: 90 TAB | Refills: 1 | Status: SHIPPED | OUTPATIENT
Start: 2021-01-18 | End: 2021-01-18 | Stop reason: SDUPTHER

## 2021-01-18 RX ORDER — ZOLPIDEM TARTRATE 5 MG/1
5 TABLET ORAL
Qty: 90 TAB | Refills: 1 | Status: SHIPPED | OUTPATIENT
Start: 2021-01-18 | End: 2021-07-17 | Stop reason: SDUPTHER

## 2021-01-18 NOTE — TELEPHONE ENCOUNTER
From: Diego Ramachandran To: Ralph Henderson MD 
Sent: 7/49/9211 9:47 PM EST Subject: Prescription Question I've been taking ambien, 5 mg., and need to have a refill, but your system is telling me it is not ready for refill. It was refilled on 10/20/2020 and is due for a refill in a couple of days. Could you please put in a prescription for me at the [facility][note 3]. Thank you very much. Yolis Ceja

## 2021-02-01 ENCOUNTER — PATIENT MESSAGE (OUTPATIENT)
Dept: INTERNAL MEDICINE CLINIC | Age: 71
End: 2021-02-01

## 2021-02-01 RX ORDER — SERTRALINE HYDROCHLORIDE 50 MG/1
50 TABLET, FILM COATED ORAL DAILY
Qty: 10 TAB | Refills: 0 | Status: SHIPPED | OUTPATIENT
Start: 2021-02-01 | End: 2021-02-15 | Stop reason: SDUPTHER

## 2021-02-01 NOTE — TELEPHONE ENCOUNTER
From: Jeannie Roche  To: Hilario Whalen MD  Sent: 1/3/8598 10:46 AM EST  Subject: Prescription Question    Hi Dr. Pablo Baker,  I wasn't paying attention and have run out of Sertraline. [facility] will be asking you for a new prescription, but it could be 10 days before I get it. I took my last pill this morning. Is it possible for you to call in to [facility] for just 10 pills so I can have this small supply until my regular prescription comes in from [facility]? I would really appreciate it. I think 10 days without it at this time would not be good for me. Thanks much.   Vinny Wolff

## 2021-02-15 RX ORDER — SERTRALINE HYDROCHLORIDE 50 MG/1
50 TABLET, FILM COATED ORAL DAILY
Qty: 90 TAB | Refills: 1 | Status: SHIPPED | OUTPATIENT
Start: 2021-02-15 | End: 2021-02-16 | Stop reason: SDUPTHER

## 2021-02-16 RX ORDER — SERTRALINE HYDROCHLORIDE 50 MG/1
50 TABLET, FILM COATED ORAL DAILY
Qty: 10 TAB | Refills: 0 | Status: SHIPPED | OUTPATIENT
Start: 2021-02-16 | End: 2021-07-27

## 2021-02-18 RX ORDER — CETIRIZINE HCL 10 MG
TABLET ORAL
Qty: 90 TAB | Refills: 3 | Status: SHIPPED | OUTPATIENT
Start: 2021-02-18 | End: 2022-02-14

## 2021-07-17 DIAGNOSIS — G47.09 OTHER INSOMNIA: ICD-10-CM

## 2021-07-19 RX ORDER — ZOLPIDEM TARTRATE 5 MG/1
5 TABLET ORAL
Qty: 90 TABLET | Refills: 1 | Status: SHIPPED | OUTPATIENT
Start: 2021-07-19 | End: 2022-01-17 | Stop reason: SDUPTHER

## 2021-07-27 RX ORDER — SERTRALINE HYDROCHLORIDE 50 MG/1
TABLET, FILM COATED ORAL
Qty: 90 TABLET | Refills: 1 | Status: SHIPPED | OUTPATIENT
Start: 2021-07-27 | End: 2022-01-24

## 2021-07-27 NOTE — TELEPHONE ENCOUNTER
Call made to patient-- patient will call office back to schedule CPE when she get home. Patient was thankful for the call.

## 2021-08-17 RX ORDER — MELOXICAM 15 MG/1
TABLET ORAL
Qty: 90 TABLET | Refills: 3 | Status: SHIPPED | OUTPATIENT
Start: 2021-08-17 | End: 2022-08-12

## 2021-09-29 RX ORDER — FLUTICASONE PROPIONATE 50 MCG
SPRAY, SUSPENSION (ML) NASAL
Qty: 48 G | Refills: 3 | Status: SHIPPED | OUTPATIENT
Start: 2021-09-29 | End: 2022-08-29

## 2021-10-15 RX ORDER — LEVALBUTEROL TARTRATE 45 UG/1
2 AEROSOL, METERED ORAL
Qty: 15 G | Refills: 11 | Status: SHIPPED | OUTPATIENT
Start: 2021-10-15 | End: 2022-09-13 | Stop reason: SDUPTHER

## 2021-10-28 ENCOUNTER — OFFICE VISIT (OUTPATIENT)
Dept: INTERNAL MEDICINE CLINIC | Age: 71
End: 2021-10-28
Payer: MEDICARE

## 2021-10-28 VITALS
WEIGHT: 132 LBS | SYSTOLIC BLOOD PRESSURE: 173 MMHG | BODY MASS INDEX: 24.29 KG/M2 | RESPIRATION RATE: 14 BRPM | TEMPERATURE: 97.3 F | HEIGHT: 62 IN | OXYGEN SATURATION: 97 % | DIASTOLIC BLOOD PRESSURE: 89 MMHG | HEART RATE: 69 BPM

## 2021-10-28 DIAGNOSIS — F33.40 RECURRENT MAJOR DEPRESSIVE DISORDER, IN REMISSION (HCC): ICD-10-CM

## 2021-10-28 DIAGNOSIS — R03.0 ELEVATED BLOOD PRESSURE READING: ICD-10-CM

## 2021-10-28 DIAGNOSIS — J01.00 ACUTE NON-RECURRENT MAXILLARY SINUSITIS: Primary | ICD-10-CM

## 2021-10-28 DIAGNOSIS — M85.80 OSTEOPENIA, UNSPECIFIED LOCATION: ICD-10-CM

## 2021-10-28 PROCEDURE — G0463 HOSPITAL OUTPT CLINIC VISIT: HCPCS | Performed by: INTERNAL MEDICINE

## 2021-10-28 PROCEDURE — G8427 DOCREV CUR MEDS BY ELIG CLIN: HCPCS | Performed by: INTERNAL MEDICINE

## 2021-10-28 PROCEDURE — G8420 CALC BMI NORM PARAMETERS: HCPCS | Performed by: INTERNAL MEDICINE

## 2021-10-28 PROCEDURE — G8399 PT W/DXA RESULTS DOCUMENT: HCPCS | Performed by: INTERNAL MEDICINE

## 2021-10-28 PROCEDURE — G8536 NO DOC ELDER MAL SCRN: HCPCS | Performed by: INTERNAL MEDICINE

## 2021-10-28 PROCEDURE — 3017F COLORECTAL CA SCREEN DOC REV: CPT | Performed by: INTERNAL MEDICINE

## 2021-10-28 PROCEDURE — 1101F PT FALLS ASSESS-DOCD LE1/YR: CPT | Performed by: INTERNAL MEDICINE

## 2021-10-28 PROCEDURE — 99214 OFFICE O/P EST MOD 30 MIN: CPT | Performed by: INTERNAL MEDICINE

## 2021-10-28 PROCEDURE — G9717 DOC PT DX DEP/BP F/U NT REQ: HCPCS | Performed by: INTERNAL MEDICINE

## 2021-10-28 PROCEDURE — G9899 SCRN MAM PERF RSLTS DOC: HCPCS | Performed by: INTERNAL MEDICINE

## 2021-10-28 PROCEDURE — 1090F PRES/ABSN URINE INCON ASSESS: CPT | Performed by: INTERNAL MEDICINE

## 2021-10-28 RX ORDER — AMOXICILLIN AND CLAVULANATE POTASSIUM 875; 125 MG/1; MG/1
1 TABLET, FILM COATED ORAL EVERY 12 HOURS
Qty: 14 TABLET | Refills: 0 | Status: SHIPPED | OUTPATIENT
Start: 2021-10-28 | End: 2021-11-04

## 2021-10-28 RX ORDER — ALENDRONATE SODIUM 35 MG/1
35 TABLET ORAL ONCE
COMMUNITY

## 2021-10-28 NOTE — PROGRESS NOTES
Note   Chief Complaint   Cold symptoms    Monica Ramesh is a 73 y.o. female     Was initially scheduled as a Medicare wellness visit but she would like to defer that at this time due to her acute illness    1. Acute non-recurrent maxillary sinusitis  Assessment & Plan:  Reports developing chest congestion, sinus congestion, fatigue, muscle aches, and chills about 2 and half weeks ago. Symptoms are still persistent. No shortness of breath, chest pain, abdominal pain, nausea, vomiting, diarrhea, constipation, fevers. She tested negative for Covid last week. Augmentin sent to pharmacy. Advised to call if symptoms do not improve  Orders:  -     amoxicillin-clavulanate (AUGMENTIN) 875-125 mg per tablet; Take 1 Tablet by mouth every twelve (12) hours for 7 days. , Normal, Disp-14 Tablet, R-0  2. Recurrent major depressive disorder, in remission [facility])  Assessment & Plan:  Well-controlled with Zoloft 50 mg daily. Continue, no changes recommended  3. Osteopenia, unspecified location  Assessment & Plan:  Patient reports she was started on Fosamax 35 mg daily by her oncologist due to osteopenia  Continue Fosamax 35 mg daily, no changes recommended  4. Elevated blood pressure reading  Assessment & Plan:  Likely related to current illness. Monitor       Benefits, risks, possible drug interactions, and side effects of all new medications were reviewed with the patient. Pt verbalized understanding. Return to clinic: Earliest convenience for Medicare wellness    An electronic signature was used to authenticate this note.   Liza Moscoso MD  [facility]  10/28/2021    Future Appointments   Date Time Provider Chandler Eaton   42/76/1880  7:92 PM Curt Olguin MD [facility] BS AMB        Objective   Vitals:       Visit Vitals  BP (!) 173/89 (BP 1 Location: Left upper arm, BP Patient Position: Sitting, BP Cuff Size: Adult)   Pulse 69   Temp 97.3 °F (36.3 °C) (Temporal)   Resp 14 Ht 5' 2\" (1.575 m)   Wt 132 lb (59.9 kg)   SpO2 97%   BMI 24.14 kg/m²        Physical Exam  Constitutional:       Comments: Appears tired   HENT:      Nose:      Comments: Nasally voice  Cardiovascular:      Rate and Rhythm: Normal rate and regular rhythm. Heart sounds: No murmur heard. No friction rub. No gallop. Pulmonary:      Effort: No respiratory distress. Breath sounds: Normal breath sounds. No wheezing, rhonchi or rales. Neurological:      Mental Status: She is alert. Current Outpatient Medications   Medication Sig    alendronate (FOSAMAX) 35 mg tablet Take 35 mg by mouth once. Once in a week    amoxicillin-clavulanate (AUGMENTIN) 875-125 mg per tablet Take 1 Tablet by mouth every twelve (12) hours for 7 days.  levalbuterol tartrate (Xopenex HFA) 45 mcg/actuation inhaler Take 2 Puffs by inhalation every four (4) hours as needed for Wheezing.  fluticasone propionate (FLONASE) 50 mcg/actuation nasal spray USE 2 SPRAYS IN EACH NOSTRIL DAILY (INFLAMMATION OF THE NOSE DUE TO AN ALLERGY)    meloxicam (MOBIC) 15 mg tablet TAKE 1 TABLET DAILY    sertraline (ZOLOFT) 50 mg tablet TAKE 1 TABLET DAILY    zolpidem (AMBIEN) 5 mg tablet Take 1 Tablet by mouth nightly as needed (as needed). Max Daily Amount: 5 mg.  atorvastatin (LIPITOR) 10 mg tablet TAKE 1 TABLET DAILY    cetirizine (ZYRTEC) 10 mg tablet TAKE 1 TABLET DAILY AS NEEDED FOR ALLERGIES    melatonin 10 mg capsule Take  by mouth nightly.  exemestane (AROMASIN) 25 mg tablet Take 25 mg by mouth daily.  nicotinic acid (NIACIN) 500 mg tablet Take 500 mg by mouth two (2) times daily (with meals).  krill/om-3/dha/epa/phospho/ast (MEGARED OMEGA-3 KRILL OIL PO) Take  by mouth.  acetylcysteine (N-ACETYL-L-CYSTEINE) Take 600 mg by mouth daily.  multivitamin (ONE A DAY) tablet Take 1 Tab by mouth daily.  calcium carbonate/vitamin D2 (LIQUID CALCIUM PO) Take  by mouth.     glucosamine-chondroitin ([facility]) 500-400 mg cap Take 1 Cap by mouth daily.  cholecalciferol (VITAMIN D3) 1,000 unit cap Take  by mouth daily.  magnesium oxide (MAG-OX) 400 mg tablet Take 400 mg by mouth daily. Indications: not taking    MILK THISTLE PO Take  by mouth.  BIOTIN PO Take  by mouth.  TURMERIC PO Take  by mouth. No current facility-administered medications for this visit.

## 2021-10-28 NOTE — ASSESSMENT & PLAN NOTE
Patient reports she was started on Fosamax 35 mg daily by her oncologist due to osteopenia  Continue Fosamax 35 mg daily, no changes recommended

## 2021-11-15 ENCOUNTER — OFFICE VISIT (OUTPATIENT)
Dept: INTERNAL MEDICINE CLINIC | Age: 71
End: 2021-11-15
Payer: MEDICARE

## 2021-11-15 VITALS
WEIGHT: 132 LBS | HEIGHT: 62 IN | BODY MASS INDEX: 24.29 KG/M2 | HEART RATE: 58 BPM | DIASTOLIC BLOOD PRESSURE: 79 MMHG | TEMPERATURE: 97.6 F | RESPIRATION RATE: 14 BRPM | SYSTOLIC BLOOD PRESSURE: 144 MMHG | OXYGEN SATURATION: 99 %

## 2021-11-15 DIAGNOSIS — R03.0 ELEVATED BLOOD PRESSURE READING: ICD-10-CM

## 2021-11-15 DIAGNOSIS — Z23 NEEDS FLU SHOT: ICD-10-CM

## 2021-11-15 DIAGNOSIS — R41.3 MEMORY PROBLEM: ICD-10-CM

## 2021-11-15 DIAGNOSIS — R79.9 ABNORMAL FINDING OF BLOOD CHEMISTRY, UNSPECIFIED: ICD-10-CM

## 2021-11-15 DIAGNOSIS — M89.9 DISORDER OF BONE, UNSPECIFIED: ICD-10-CM

## 2021-11-15 DIAGNOSIS — J01.00 ACUTE NON-RECURRENT MAXILLARY SINUSITIS: ICD-10-CM

## 2021-11-15 DIAGNOSIS — E78.00 HYPERCHOLESTEROLEMIA: ICD-10-CM

## 2021-11-15 DIAGNOSIS — Z00.00 MEDICARE ANNUAL WELLNESS VISIT, SUBSEQUENT: Primary | ICD-10-CM

## 2021-11-15 DIAGNOSIS — M85.80 OSTEOPENIA, UNSPECIFIED LOCATION: ICD-10-CM

## 2021-11-15 PROCEDURE — 1101F PT FALLS ASSESS-DOCD LE1/YR: CPT | Performed by: INTERNAL MEDICINE

## 2021-11-15 PROCEDURE — G8399 PT W/DXA RESULTS DOCUMENT: HCPCS | Performed by: INTERNAL MEDICINE

## 2021-11-15 PROCEDURE — G0439 PPPS, SUBSEQ VISIT: HCPCS | Performed by: INTERNAL MEDICINE

## 2021-11-15 PROCEDURE — G8427 DOCREV CUR MEDS BY ELIG CLIN: HCPCS | Performed by: INTERNAL MEDICINE

## 2021-11-15 PROCEDURE — G9717 DOC PT DX DEP/BP F/U NT REQ: HCPCS | Performed by: INTERNAL MEDICINE

## 2021-11-15 PROCEDURE — G9899 SCRN MAM PERF RSLTS DOC: HCPCS | Performed by: INTERNAL MEDICINE

## 2021-11-15 PROCEDURE — G8420 CALC BMI NORM PARAMETERS: HCPCS | Performed by: INTERNAL MEDICINE

## 2021-11-15 PROCEDURE — 90694 VACC AIIV4 NO PRSRV 0.5ML IM: CPT | Performed by: INTERNAL MEDICINE

## 2021-11-15 PROCEDURE — G8536 NO DOC ELDER MAL SCRN: HCPCS | Performed by: INTERNAL MEDICINE

## 2021-11-15 PROCEDURE — 3017F COLORECTAL CA SCREEN DOC REV: CPT | Performed by: INTERNAL MEDICINE

## 2021-11-15 NOTE — PATIENT INSTRUCTIONS
Medicare Wellness Visit, Female     The best way to live healthy is to have a lifestyle where you eat a well-balanced diet, exercise regularly, limit alcohol use, and quit all forms of tobacco/nicotine, if applicable. Regular preventive services are another way to keep healthy. Preventive services (vaccines, screening tests, monitoring & exams) can help personalize your care plan, which helps you manage your own care. Screening tests can find health problems at the earliest stages, when they are easiest to treat. [facility] follows the current, evidence-based guidelines published by the [facility] Mahin Andres ([facility]) when recommending preventive services for our patients. Because we follow these guidelines, sometimes recommendations change over time as research supports it. (For example, mammograms used to be recommended annually. Even though Medicare will still pay for an annual mammogram, the newer guidelines recommend a mammogram every two years for women of average risk). Of course, you and your doctor may decide to screen more often for some diseases, based on your risk and your co-morbidities (chronic disease you are already diagnosed with). Preventive services for you include:  - Medicare offers their members a free annual wellness visit, which is time for you and your primary care provider to discuss and plan for your preventive service needs. Take advantage of this benefit every year!  -All adults over the age of 72 should receive the recommended pneumonia vaccines. Current USPSTF guidelines recommend a series of two vaccines for the best pneumonia protection.   -All adults should have a flu vaccine yearly and a tetanus vaccine every 10 years.   -All adults age 48 and older should receive the shingles vaccines (series of two vaccines).       -All adults age 38-68 who are overweight should have a diabetes screening test once every three years.   -All adults born between [birth year] and 1963 should be screened once for Hepatitis C.  -Other screening tests and preventive services for persons with diabetes include: an eye exam to screen for diabetic retinopathy, a kidney function test, a foot exam, and stricter control over your cholesterol.   -Cardiovascular screening for adults with routine risk involves an electrocardiogram (ECG) at intervals determined by your doctor.   -Colorectal cancer screenings should be done for adults age 54-65 with no increased risk factors for colorectal cancer. There are a number of acceptable methods of screening for this type of cancer. Each test has its own benefits and drawbacks. Discuss with your doctor what is most appropriate for you during your annual wellness visit. The different tests include: colonoscopy (considered the best screening method), a fecal occult blood test, a fecal DNA test, and sigmoidoscopy.    -A bone mass density test is recommended when a woman turns 65 to screen for osteoporosis. This test is only recommended one time, as a screening. Some providers will use this same test as a disease monitoring tool if you already have osteoporosis. -Breast cancer screenings are recommended every other year for women of normal risk, age 54-69.  -Cervical cancer screenings for women over age 72 are only recommended with certain risk factors. Here is a list of your current Health Maintenance items (your personalized list of preventive services) with a due date:  Health Maintenance Due   Topic Date Due    Shingles Vaccine (1 of 2) Never done    Pneumococcal Vaccine (2 of 2 - PPSV23) 11/29/2019    Mammogram  10/07/2020    Yearly Flu Vaccine (1) 09/01/2021    COVID-19 Vaccine (3 - Booster for Moderna series) 10/04/2021    Cholesterol Test   10/19/2021         Vaccine Information Statement    Influenza (Flu) Vaccine (Inactivated or Recombinant):  What You Need to Know    Many vaccine information statements are available in [address] and other languages. See www.immunize.org/vis. Hojas de información sobre vacunas están disponibles en español y en muchos otros idiomas. Visite www.immunize.org/vis. 1. Why get vaccinated? Influenza vaccine can prevent influenza (flu). Flu is a contagious disease that spreads around the [facility] every year, usually between October and May. Anyone can get the flu, but it is more dangerous for some people. Infants and young children, people 72 years and older, pregnant people, and people with certain health conditions or a weakened immune system are at greatest risk of flu complications. Pneumonia, bronchitis, sinus infections, and ear infections are examples of flu-related complications. If you have a medical condition, such as heart disease, cancer, or diabetes, flu can make it worse. Flu can cause fever and chills, sore throat, muscle aches, fatigue, cough, headache, and runny or stuffy nose. Some people may have vomiting and diarrhea, though this is more common in children than adults. In an average year, thousands of people in the [facility] die from flu, and many more are hospitalized. Flu vaccine prevents millions of illnesses and flu-related visits to the doctor each year. 2. Influenza vaccines     CDC recommends everyone 6 months and older get vaccinated every flu season. Children 6 months through 6years of age may need 2 doses during a single flu season. Everyone else needs only 1 dose each flu season. It takes about 2 weeks for protection to develop after vaccination. There are many flu viruses, and they are always changing. Each year a new flu vaccine is made to protect against the influenza viruses believed to be likely to cause disease in the upcoming flu season. Even when the vaccine doesnt exactly match these viruses, it may still provide some protection. Influenza vaccine does not cause flu.     Influenza vaccine may be given at the same time as other vaccines. 3. Talk with your health care provider    Tell your vaccination provider if the person getting the vaccine:   Has had an allergic reaction after a previous dose of influenza vaccine, or has any severe, life-threatening allergies    Has ever had Guillain-Barré Syndrome (also called GBS)    In some cases, your health care provider may decide to postpone influenza vaccination until a future visit. Influenza vaccine can be administered at any time during pregnancy. People who are or will be pregnant during influenza season should receive inactivated influenza vaccine. People with minor illnesses, such as a cold, may be vaccinated. People who are moderately or severely ill should usually wait until they recover before getting influenza vaccine. Your health care provider can give you more information. 4. Risks of a vaccine reaction     Soreness, redness, and swelling where the shot is given, fever, muscle aches, and headache can happen after influenza vaccination.  There may be a very small increased risk of Guillain-Barré Syndrome (GBS) after inactivated influenza vaccine (the flu shot). Dionisio Lucas children who get the flu shot along with pneumococcal vaccine (PCV13) and/or DTaP vaccine at the same time might be slightly more likely to have a seizure caused by fever. Tell your health care provider if a child who is getting flu vaccine has ever had a seizure. People sometimes faint after medical procedures, including vaccination. Tell your provider if you feel dizzy or have vision changes or ringing in the ears. As with any medicine, there is a very remote chance of a vaccine causing a severe allergic reaction, other serious injury, or death. 5. What if there is a serious problem? An allergic reaction could occur after the vaccinated person leaves the clinic.  If you see signs of a severe allergic reaction (hives, swelling of the face and throat, difficulty breathing, a fast heartbeat, dizziness, or weakness), call 9-1-1 and get the person to the nearest hospital.    For other signs that concern you, call your health care provider. Adverse reactions should be reported to the Vaccine Adverse Event Reporting System (VAERS). Your health care provider will usually file this report, or you can do it yourself. Visit the VAERS website at www.vaers. [facility].gov or call 3-467.414.7481. VAERS is only for reporting reactions, and VAERS staff members do not give medical advice. 6. The National Vaccine Injury Compensation Program    The [facility] Vaccine Injury Compensation Program (VICP) is a federal program that was created to compensate people who may have been injured by certain vaccines. Claims regarding alleged injury or death due to vaccination have a time limit for filing, which may be as short as two years. Visit the VICP website at www.[facility].gov/vaccinecompensation or call 0-843.385.4947 to learn about the program and about filing a claim. 7. How can I learn more?  Ask your health care provider.  Call your local or state health department.  Visit the website of the Food and Drug Administration (FDA) for vaccine package inserts and additional information at www.fda.gov/vaccines-blood-biologics/vaccines.  Contact the Centers for Disease Control and Prevention (CDC):  - Call 1-146.869.9060 (1-800-CDC-INFO) or  - Visit CDCs influenza website at www.cdc.gov/flu. Vaccine Information Statement   Inactivated Influenza Vaccine   8/6/2021  [address] Aris Osgood [id number]-[id number]   Department of Health and Human Services  Centers for Disease Control and Prevention    Office Use Only

## 2021-11-15 NOTE — ASSESSMENT & PLAN NOTE
Reports blood pressure running 130s over 80s at home. Elevated today but still likely related to current illness.   Monitor

## 2021-11-15 NOTE — ASSESSMENT & PLAN NOTE
Better compared to last time though still has some sinus congestion, drainage  Continue to monitor. Expect improvement.   Continue Xopenex as needed

## 2021-11-15 NOTE — PROGRESS NOTES
Patient present for routine immunizations. Pt denies any symptoms , reactions or allergies that would exclude them from being immunized today. Risks and adverse reactions were discussed and the VIS was given to them. All questions were addressed. Pt was observed for 10 min post injection. There were no reactions observed.       Holly Hoffman 172

## 2021-11-15 NOTE — ASSESSMENT & PLAN NOTE
She will see if she has had a pneumonia vaccine. Planning on having mammogram in January. She does this with [facility]. She had VZV tested previously and was negative. Kristy Perea She will try to get us a copy of her advance directive.

## 2021-11-15 NOTE — ASSESSMENT & PLAN NOTE
During questions for Medicare wellness, she does note sometimes she will forget names or what she had for meals or why she went into her room. Does not affect her daily life.   She will discuss with her  and consider a memory screening test

## 2021-11-15 NOTE — PROGRESS NOTES
Assessment and Plan     1. Medicare annual wellness visit, subsequent  Assessment & Plan:  She will see if she has had a pneumonia vaccine. Planning on having mammogram in January. She does this with David Londono. She had VZV tested previously and was negative. Kamla Rojo She will try to get us a copy of her advance directive. 2. Elevated blood pressure reading  Assessment & Plan:  Reports blood pressure running 130s over 80s at home. Elevated today but still likely related to current illness. Monitor  3. Osteopenia, unspecified location  -     CBC W/O DIFF; Future  -     METABOLIC PANEL, COMPREHENSIVE; Future  -     VITAMIN D, 25 HYDROXY; Future  4. Hypercholesterolemia  -     HEMOGLOBIN A1C WITH EAG; Future  -     LIPID PANEL; Future  5. Abnormal finding of blood chemistry, unspecified   -     HEMOGLOBIN A1C WITH EAG; Future  6. Disorder of bone, unspecified   -     VITAMIN D, 25 HYDROXY; Future  7. Needs flu shot  -     FLU (FLUAD QUAD INFLUENZA VACCINE,QUAD,ADJUVANTED)  8. Acute non-recurrent maxillary sinusitis  Assessment & Plan:  Better compared to last time though still has some sinus congestion, drainage  Continue to monitor. Expect improvement. Continue Xopenex as needed  9. Memory problem  Assessment & Plan:  During questions for Medicare wellness, she does note sometimes she will forget names or what she had for meals or why she went into her room. Does not affect her daily life. She will discuss with her  and consider a memory screening test       Benefits, risks, possible drug interactions, and side effects of all new medications were reviewed with the patient. Pt verbalized understanding. Return to clinic: 1 year for physical or earlier if needed if blood pressure persistently elevated or if she would like memory testing    An electronic signature was used to authenticate this note.   Tarun Buckner MD  [facility]  11/15/2021    No future appointments. History of Present Illness   Chief Complaint   Medicare wellness    Lali Rudolph is a 73 y.o. female         Review of Systems   Constitutional: Negative for chills and fever. HENT: Negative for hearing loss. Eyes: Negative for blurred vision. Respiratory: Negative for shortness of breath. Cardiovascular: Negative for chest pain. Gastrointestinal: Negative for abdominal pain, blood in stool, constipation, diarrhea, melena, nausea and vomiting. Genitourinary: Negative for dysuria and hematuria. Musculoskeletal: Negative for joint pain. Skin: Negative for rash. Neurological: Negative for headaches. Past Medical History     Allergies   Allergen Reactions    Prednisone Anaphylaxis and Shortness of Breath     Other reaction(s): anaphylaxis/angioedema      Cefazolin Cough and Itching     Other reaction(s): cough  Other reaction(s): Itching  Other reaction(s): Itching      Other Medication Other (comments)     Other reaction(s): unknown  Increased liver function    Statins-Hmg-Coa Reductase Inhibitors Other (comments)     Muscle aches        Current Outpatient Medications   Medication Sig    alendronate (FOSAMAX) 35 mg tablet Take 35 mg by mouth once. Once in a week    levalbuterol tartrate (Xopenex HFA) 45 mcg/actuation inhaler Take 2 Puffs by inhalation every four (4) hours as needed for Wheezing.  fluticasone propionate (FLONASE) 50 mcg/actuation nasal spray USE 2 SPRAYS IN EACH NOSTRIL DAILY (INFLAMMATION OF THE NOSE DUE TO AN ALLERGY)    meloxicam (MOBIC) 15 mg tablet TAKE 1 TABLET DAILY    sertraline (ZOLOFT) 50 mg tablet TAKE 1 TABLET DAILY    zolpidem (AMBIEN) 5 mg tablet Take 1 Tablet by mouth nightly as needed (as needed). Max Daily Amount: 5 mg.  atorvastatin (LIPITOR) 10 mg tablet TAKE 1 TABLET DAILY    cetirizine (ZYRTEC) 10 mg tablet TAKE 1 TABLET DAILY AS NEEDED FOR ALLERGIES    melatonin 10 mg capsule Take  by mouth nightly.     exemestane (AROMASIN) 25 mg tablet Take 25 mg by mouth daily.  nicotinic acid (NIACIN) 500 mg tablet Take 500 mg by mouth two (2) times daily (with meals).  krill/om-3/dha/epa/phospho/ast (MEGARED OMEGA-3 KRILL OIL PO) Take  by mouth.  acetylcysteine (N-ACETYL-L-CYSTEINE) Take 600 mg by mouth daily.  multivitamin (ONE A DAY) tablet Take 1 Tab by mouth daily.  calcium carbonate/vitamin D2 (LIQUID CALCIUM PO) Take  by mouth.  glucosamine-chondroitin (ARTHX) 500-400 mg cap Take 1 Cap by mouth daily.  cholecalciferol (VITAMIN D3) 1,000 unit cap Take  by mouth daily.  magnesium oxide (MAG-OX) 400 mg tablet Take 400 mg by mouth daily. Indications: not taking    MILK THISTLE PO Take  by mouth.  BIOTIN PO Take  by mouth.  TURMERIC PO Take  by mouth. No current facility-administered medications for this visit.           Patient Active Problem List   Diagnosis Code    Mild intermittent asthma without complication C85.82    Arthritis M19.90    Hypercholesterolemia E78.00    History of breast cancer Z85.3    Recurrent major depressive disorder, in remission ([facility] [address] F33.40    Chronic rhinitis J31.0    Other insomnia G47.09    Gastroesophageal reflux disease without esophagitis K21.9    Acute non-recurrent maxillary sinusitis J01.00    Osteopenia M85.80    Elevated blood pressure reading R03.0    Medicare annual wellness visit, subsequent Z00.00    Memory problem R41.3     Past Surgical History:   Procedure Laterality Date    HX BREAST LUMPECTOMY      HX ORTHOPAEDIC      bilateral Thumbs    HX ORTHOPAEDIC      Left lower Leg with hardware    HX ORTHOPAEDIC      Ramirez Neuroma    HX TONSIL AND ADENOIDECTOMY      HX WISDOM TEETH EXTRACTION        Social History     Tobacco Use    Smoking status: Former Smoker     Packs/day: 1.50     Years: 23.00     Pack years: 34.50     Quit date:      Years since quittin.8    Smokeless tobacco: Never Used   Substance Use Topics    Alcohol use: Yes     Alcohol/week: 7.0 standard drinks     Types: 7 Cans of beer per week      Family History   Problem Relation Age of Onset    Heart Attack Mother 71        3V cabg    Diabetes Mother     Other Mother         cholesterol    Pancreatic Cancer Father 68    Diabetes Brother         complicated by leg amputation        Physical Exam   Vitals:       Visit Vitals  BP (!) 144/79 (BP 1 Location: Left upper arm, BP Patient Position: Sitting, BP Cuff Size: Adult)   Pulse (!) 58   Temp 97.6 °F (36.4 °C) (Oral)   Resp 14   Ht 5' 2\" (1.575 m)   Wt 132 lb (59.9 kg)   SpO2 99%   BMI 24.14 kg/m²        Physical Exam  Constitutional:       General: She is not in acute distress. Appearance: She is well-developed. HENT:      Right Ear: Tympanic membrane, ear canal and external ear normal.      Left Ear: Tympanic membrane, ear canal and external ear normal.   Eyes:      Extraocular Movements: Extraocular movements intact. Conjunctiva/sclera: Conjunctivae normal.   Neck:      Vascular: No carotid bruit. Cardiovascular:      Rate and Rhythm: Normal rate and regular rhythm. Pulses: Normal pulses. Heart sounds: No murmur heard. No friction rub. No gallop. Pulmonary:      Effort: No respiratory distress. Breath sounds: No wheezing, rhonchi or rales. Abdominal:      General: Bowel sounds are normal. There is no distension. Palpations: Abdomen is soft. There is no hepatomegaly, splenomegaly or mass. Tenderness: There is no abdominal tenderness. There is no guarding. Musculoskeletal:      Cervical back: Neck supple. Lymphadenopathy:      Cervical: No cervical adenopathy. Skin:     General: Skin is warm. Findings: No rash. Neurological:      Mental Status: She is alert.             This is the Subsequent Medicare Annual Wellness Exam, performed 12 months or more after the Initial AWV or the last Subsequent AWV    I have reviewed the patient's medical history in detail and updated the computerized patient record. Assessment/Plan   Education and counseling provided:  Are appropriate based on today's review and evaluation    1. Medicare annual wellness visit, subsequent  Assessment & Plan:  She will see if she has had a pneumonia vaccine. Planning on having mammogram in January. She does this with Amita Jha. She had VZV tested previously and was negative. Shaaron Money She will try to get us a copy of her advance directive. 2. Elevated blood pressure reading  Assessment & Plan:  Reports blood pressure running 130s over 80s at home. Elevated today but still likely related to current illness. Monitor  3. Osteopenia, unspecified location  -     CBC W/O DIFF; Future  -     METABOLIC PANEL, COMPREHENSIVE; Future  -     VITAMIN D, 25 HYDROXY; Future  4. Hypercholesterolemia  -     HEMOGLOBIN A1C WITH EAG; Future  -     LIPID PANEL; Future  5. Abnormal finding of blood chemistry, unspecified   -     HEMOGLOBIN A1C WITH EAG; Future  6. Disorder of bone, unspecified   -     VITAMIN D, 25 HYDROXY; Future  7. Needs flu shot  -     FLU (FLUAD QUAD INFLUENZA VACCINE,QUAD,ADJUVANTED)  8. Acute non-recurrent maxillary sinusitis  Assessment & Plan:  Better compared to last time though still has some sinus congestion, drainage  Continue to monitor. Expect improvement. Continue Xopenex as needed  9. Memory problem  Assessment & Plan:  During questions for Medicare wellness, she does note sometimes she will forget names or what she had for meals or why she went into her room. Does not affect her daily life.   She will discuss with her  and consider a memory screening test       Depression Risk Factor Screening     3 most recent PHQ Screens 10/27/2020   Little interest or pleasure in doing things Not at all   Feeling down, depressed, irritable, or hopeless Not at all   Total Score PHQ 2 0       Alcohol Risk Screen    Do you average more than 1 drink per night or more than 7 drinks a week:  No    On any one occasion in the past three months have you have had more than 3 drinks containing alcohol:  No        Functional Ability and Level of Safety    Hearing: Hearing is good. Activities of Daily Living: The home contains: handrails and grab bars  Patient does total self care      Ambulation: with no difficulty     Fall Risk:  Fall Risk Assessment, last 12 mths 11/15/2021   Able to walk? Yes   Fall in past 12 months? 0   Do you feel unsteady? 0   Are you worried about falling 0   Number of falls in past 12 months -   Fall with injury?  -      Abuse Screen:  Patient is not abused       Cognitive Screening    Has your family/caregiver stated any concerns about your memory: yes - she has concerns     Health Maintenance Due     Health Maintenance Due   Topic Date Due    Pneumococcal 65+ years (2 of 2 - PPSV23) 11/29/2019    Breast Cancer Screen Mammogram  10/07/2020    COVID-19 Vaccine (3 - Booster for Lena Dues series) 10/04/2021    Lipid Screen  10/19/2021       Patient Care Team   Patient Care Team:  Galindo Richmond MD as PCP - General (Internal Medicine)  Galindo Richmond MD as PCP - [address] [place] [address] Provider    History     Patient Active Problem List   Diagnosis Code    Mild intermittent asthma without complication W43.80    Arthritis M19.90    Hypercholesterolemia E78.00    History of breast cancer Z85.3    Recurrent major depressive disorder, in remission ([facility] [address] F33.40    Chronic rhinitis J31.0    Other insomnia G47.09    Gastroesophageal reflux disease without esophagitis K21.9    Acute non-recurrent maxillary sinusitis J01.00    Osteopenia M85.80    Elevated blood pressure reading R03.0    Medicare annual wellness visit, subsequent Z00.00    Memory problem R41.3     Past Medical History:   Diagnosis Date    Arthritis     Hypercholesterolemia       Past Surgical History:   Procedure Laterality Date    HX BREAST LUMPECTOMY      HX ORTHOPAEDIC      bilateral Thumbs    HX ORTHOPAEDIC      Left lower Leg with hardware    HX ORTHOPAEDIC      Ramirez Neuroma    HX TONSIL AND ADENOIDECTOMY      HX WISDOM TEETH EXTRACTION       Current Outpatient Medications   Medication Sig Dispense Refill    alendronate (FOSAMAX) 35 mg tablet Take 35 mg by mouth once. Once in a week      levalbuterol tartrate (Xopenex HFA) 45 mcg/actuation inhaler Take 2 Puffs by inhalation every four (4) hours as needed for Wheezing. 15 g 11    fluticasone propionate (FLONASE) 50 mcg/actuation nasal spray USE 2 SPRAYS IN EACH NOSTRIL DAILY (INFLAMMATION OF THE NOSE DUE TO AN ALLERGY) 48 g 3    meloxicam (MOBIC) 15 mg tablet TAKE 1 TABLET DAILY 90 Tablet 3    sertraline (ZOLOFT) 50 mg tablet TAKE 1 TABLET DAILY 90 Tablet 1    zolpidem (AMBIEN) 5 mg tablet Take 1 Tablet by mouth nightly as needed (as needed). Max Daily Amount: 5 mg. 90 Tablet 1    atorvastatin (LIPITOR) 10 mg tablet TAKE 1 TABLET DAILY 90 Tab 0    cetirizine (ZYRTEC) 10 mg tablet TAKE 1 TABLET DAILY AS NEEDED FOR ALLERGIES 90 Tab 3    melatonin 10 mg capsule Take  by mouth nightly.  exemestane (AROMASIN) 25 mg tablet Take 25 mg by mouth daily.  nicotinic acid (NIACIN) 500 mg tablet Take 500 mg by mouth two (2) times daily (with meals).  krill/om-3/dha/epa/phospho/ast (MEGARED OMEGA-3 KRILL OIL PO) Take  by mouth.  acetylcysteine (N-ACETYL-L-CYSTEINE) Take 600 mg by mouth daily.  multivitamin (ONE A DAY) tablet Take 1 Tab by mouth daily.  calcium carbonate/vitamin D2 (LIQUID CALCIUM PO) Take  by mouth.  glucosamine-chondroitin (ARTHX) 500-400 mg cap Take 1 Cap by mouth daily.  cholecalciferol (VITAMIN D3) 1,000 unit cap Take  by mouth daily.  magnesium oxide (MAG-OX) 400 mg tablet Take 400 mg by mouth daily. Indications: not taking      MILK THISTLE PO Take  by mouth.  BIOTIN PO Take  by mouth.  TURMERIC PO Take  by mouth.        Allergies   Allergen Reactions    Prednisone Anaphylaxis and Shortness of Breath     Other reaction(s): anaphylaxis/angioedema      Cefazolin Cough and Itching     Other reaction(s): cough  Other reaction(s): Itching  Other reaction(s): Itching      Other Medication Other (comments)     Other reaction(s): unknown  Increased liver function    Statins-Hmg-Coa Reductase Inhibitors Other (comments)     Muscle aches       Family History   Problem Relation Age of Onset    Heart Attack Mother 71        3V cabg    Diabetes Mother     Other Mother         cholesterol    Pancreatic Cancer Father 68    Diabetes Brother         complicated by leg amputation     Social History     Tobacco Use    Smoking status: Former Smoker     Packs/day: 1.50     Years: 23.00     Pack years: 34.50     Quit date:      Years since quittin.8    Smokeless tobacco: Never Used   Substance Use Topics    Alcohol use:  Yes     Alcohol/week: 7.0 standard drinks     Types: 7 Cans of beer per week         Padmaja Bravo MD

## 2021-12-13 DIAGNOSIS — E78.00 HYPERCHOLESTEROLEMIA: ICD-10-CM

## 2021-12-13 DIAGNOSIS — M85.80 OSTEOPENIA, UNSPECIFIED LOCATION: ICD-10-CM

## 2021-12-13 DIAGNOSIS — R79.9 ABNORMAL FINDING OF BLOOD CHEMISTRY, UNSPECIFIED: ICD-10-CM

## 2021-12-13 DIAGNOSIS — M89.9 DISORDER OF BONE, UNSPECIFIED: ICD-10-CM

## 2021-12-14 LAB
25(OH)D3 SERPL-MCNC: 69.9 NG/ML (ref 30–100)
ALBUMIN SERPL-MCNC: 3.7 G/DL (ref 3.5–5)
ALBUMIN/GLOB SERPL: 1.2 {RATIO} (ref 1.1–2.2)
ALP SERPL-CCNC: 76 U/L (ref 45–117)
ALT SERPL-CCNC: 31 U/L (ref 12–78)
ANION GAP SERPL CALC-SCNC: 6 MMOL/L (ref 5–15)
AST SERPL-CCNC: 20 U/L (ref 15–37)
BILIRUB SERPL-MCNC: 0.3 MG/DL (ref 0.2–1)
BUN SERPL-MCNC: 10 MG/DL (ref 6–20)
BUN/CREAT SERPL: 16 (ref 12–20)
CALCIUM SERPL-MCNC: 9 MG/DL (ref 8.5–10.1)
CHLORIDE SERPL-SCNC: 110 MMOL/L (ref 97–108)
CHOLEST SERPL-MCNC: 169 MG/DL
CO2 SERPL-SCNC: 28 MMOL/L (ref 21–32)
CREAT SERPL-MCNC: 0.64 MG/DL (ref 0.55–1.02)
ERYTHROCYTE [DISTWIDTH] IN BLOOD BY AUTOMATED COUNT: 12.1 % (ref 11.5–14.5)
EST. AVERAGE GLUCOSE BLD GHB EST-MCNC: 120 MG/DL
GLOBULIN SER CALC-MCNC: 3 G/DL (ref 2–4)
GLUCOSE SERPL-MCNC: 94 MG/DL (ref 65–100)
HBA1C MFR BLD: 5.8 % (ref 4–5.6)
HCT VFR BLD AUTO: 41 % (ref 35–47)
HDLC SERPL-MCNC: 46 MG/DL
HDLC SERPL: 3.7 {RATIO} (ref 0–5)
HGB BLD-MCNC: 12.7 G/DL (ref 11.5–16)
LDLC SERPL CALC-MCNC: 98 MG/DL (ref 0–100)
MCH RBC QN AUTO: 29.2 PG (ref 26–34)
MCHC RBC AUTO-ENTMCNC: 31 G/DL (ref 30–36.5)
MCV RBC AUTO: 94.3 FL (ref 80–99)
NRBC # BLD: 0 K/UL (ref 0–0.01)
NRBC BLD-RTO: 0 PER 100 WBC
PLATELET # BLD AUTO: 203 K/UL (ref 150–400)
PMV BLD AUTO: 12.7 FL (ref 8.9–12.9)
POTASSIUM SERPL-SCNC: 4.4 MMOL/L (ref 3.5–5.1)
PROT SERPL-MCNC: 6.7 G/DL (ref 6.4–8.2)
RBC # BLD AUTO: 4.35 M/UL (ref 3.8–5.2)
SODIUM SERPL-SCNC: 144 MMOL/L (ref 136–145)
TRIGL SERPL-MCNC: 125 MG/DL (ref ?–150)
VLDLC SERPL CALC-MCNC: 25 MG/DL
WBC # BLD AUTO: 4.4 K/UL (ref 3.6–11)

## 2021-12-16 NOTE — PROGRESS NOTES
App[employer] message sent. LDL 98. Vitamin D 69. A1c 5.8, prediabetic.   CBC normal.  CMP normal.    Cholesterol [id number] prediabetic, monitor  Otherwise normal

## 2022-01-17 DIAGNOSIS — G47.09 OTHER INSOMNIA: ICD-10-CM

## 2022-01-17 RX ORDER — ATORVASTATIN CALCIUM 10 MG/1
TABLET, FILM COATED ORAL
Qty: 90 TABLET | Refills: 3 | Status: SHIPPED | OUTPATIENT
Start: 2022-01-17

## 2022-01-17 RX ORDER — ZOLPIDEM TARTRATE 5 MG/1
5 TABLET ORAL
Qty: 90 TABLET | Refills: 1 | Status: SHIPPED | OUTPATIENT
Start: 2022-01-17 | End: 2022-06-30 | Stop reason: SDUPTHER

## 2022-01-24 RX ORDER — SERTRALINE HYDROCHLORIDE 50 MG/1
TABLET, FILM COATED ORAL
Qty: 90 TABLET | Refills: 3 | Status: SHIPPED | OUTPATIENT
Start: 2022-01-24

## 2022-02-10 LAB — MAMMOGRAPHY, EXTERNAL: NORMAL

## 2022-02-14 RX ORDER — CETIRIZINE HCL 10 MG
TABLET ORAL
Qty: 90 TABLET | Refills: 3 | Status: SHIPPED | OUTPATIENT
Start: 2022-02-14

## 2022-03-18 PROBLEM — M19.90 ARTHRITIS: Status: ACTIVE | Noted: 2019-11-18

## 2022-03-18 PROBLEM — J45.20 MILD INTERMITTENT ASTHMA WITHOUT COMPLICATION: Status: ACTIVE | Noted: 2019-11-18

## 2022-03-18 PROBLEM — G47.09 OTHER INSOMNIA: Status: ACTIVE | Noted: 2019-11-18

## 2022-03-18 PROBLEM — K21.9 GASTROESOPHAGEAL REFLUX DISEASE WITHOUT ESOPHAGITIS: Status: ACTIVE | Noted: 2019-11-18

## 2022-03-18 PROBLEM — J31.0 CHRONIC RHINITIS: Status: ACTIVE | Noted: 2019-11-18

## 2022-03-18 PROBLEM — R03.0 ELEVATED BLOOD PRESSURE READING: Status: ACTIVE | Noted: 2021-10-28

## 2022-03-19 PROBLEM — E78.00 HYPERCHOLESTEROLEMIA: Status: ACTIVE | Noted: 2019-11-18

## 2022-03-19 PROBLEM — J01.00 ACUTE NON-RECURRENT MAXILLARY SINUSITIS: Status: ACTIVE | Noted: 2021-10-28

## 2022-03-19 PROBLEM — Z85.3 HISTORY OF BREAST CANCER: Status: ACTIVE | Noted: 2019-11-18

## 2022-03-19 PROBLEM — M85.80 OSTEOPENIA: Status: ACTIVE | Noted: 2021-10-28

## 2022-03-19 PROBLEM — Z00.00 MEDICARE ANNUAL WELLNESS VISIT, SUBSEQUENT: Status: ACTIVE | Noted: 2021-11-15

## 2022-03-19 PROBLEM — R41.3 MEMORY PROBLEM: Status: ACTIVE | Noted: 2021-11-15

## 2022-03-20 PROBLEM — F33.40 RECURRENT MAJOR DEPRESSIVE DISORDER, IN REMISSION (HCC): Status: ACTIVE | Noted: 2019-11-18

## 2022-06-30 DIAGNOSIS — G47.09 OTHER INSOMNIA: ICD-10-CM

## 2022-07-01 RX ORDER — ZOLPIDEM TARTRATE 5 MG/1
5 TABLET ORAL
Qty: 90 TABLET | Refills: 1 | Status: SHIPPED | OUTPATIENT
Start: 2022-07-01

## 2022-08-12 RX ORDER — MELOXICAM 15 MG/1
TABLET ORAL
Qty: 90 TABLET | Refills: 3 | Status: SHIPPED | OUTPATIENT
Start: 2022-08-12

## 2022-08-29 RX ORDER — FLUTICASONE PROPIONATE 50 MCG
SPRAY, SUSPENSION (ML) NASAL
Qty: 48 G | Refills: 3 | Status: SHIPPED | OUTPATIENT
Start: 2022-08-29

## 2022-09-14 RX ORDER — LEVALBUTEROL TARTRATE 45 UG/1
2 AEROSOL, METERED ORAL
Qty: 1 EACH | Refills: 11 | Status: SHIPPED | OUTPATIENT
Start: 2022-09-14

## 2022-12-07 ENCOUNTER — APPOINTMENT (OUTPATIENT)
Dept: GENERAL RADIOLOGY | Age: 72
End: 2022-12-07
Attending: EMERGENCY MEDICINE
Payer: MEDICARE

## 2022-12-07 ENCOUNTER — HOSPITAL ENCOUNTER (EMERGENCY)
Age: 72
Discharge: HOME | End: 2022-12-07
Attending: EMERGENCY MEDICINE
Payer: MEDICARE

## 2022-12-07 VITALS
WEIGHT: 137 LBS | SYSTOLIC BLOOD PRESSURE: 148 MMHG | HEART RATE: 59 BPM | TEMPERATURE: 99 F | BODY MASS INDEX: 25.21 KG/M2 | HEIGHT: 62 IN | RESPIRATION RATE: 17 BRPM | DIASTOLIC BLOOD PRESSURE: 86 MMHG | OXYGEN SATURATION: 96 %

## 2022-12-07 DIAGNOSIS — R07.9 CHEST PAIN, UNSPECIFIED TYPE: ICD-10-CM

## 2022-12-07 DIAGNOSIS — J06.9 ACUTE URI: ICD-10-CM

## 2022-12-07 DIAGNOSIS — R05.1 ACUTE COUGH: Primary | ICD-10-CM

## 2022-12-07 LAB
ALBUMIN SERPL-MCNC: 3.5 G/DL (ref 3.5–5)
ALBUMIN/GLOB SERPL: 1.2 {RATIO} (ref 1.1–2.2)
ALP SERPL-CCNC: 50 U/L (ref 45–117)
ALT SERPL-CCNC: 27 U/L (ref 12–78)
ANION GAP SERPL CALC-SCNC: 9 MMOL/L (ref 5–15)
AST SERPL-CCNC: 20 U/L (ref 15–37)
BASOPHILS # BLD: 0 K/UL (ref 0–0.1)
BASOPHILS NFR BLD: 0 % (ref 0–1)
BILIRUB SERPL-MCNC: 0.2 MG/DL (ref 0.2–1)
BUN SERPL-MCNC: 12 MG/DL (ref 6–20)
BUN/CREAT SERPL: 17 (ref 12–20)
CALCIUM SERPL-MCNC: 8.4 MG/DL (ref 8.5–10.1)
CHLORIDE SERPL-SCNC: 106 MMOL/L (ref 97–108)
CO2 SERPL-SCNC: 27 MMOL/L (ref 21–32)
CREAT SERPL-MCNC: 0.71 MG/DL (ref 0.55–1.02)
DIFFERENTIAL METHOD BLD: ABNORMAL
EOSINOPHIL # BLD: 0 K/UL (ref 0–0.4)
EOSINOPHIL NFR BLD: 1 % (ref 0–7)
ERYTHROCYTE [DISTWIDTH] IN BLOOD BY AUTOMATED COUNT: 12.5 % (ref 11.5–14.5)
GLOBULIN SER CALC-MCNC: 3 G/DL (ref 2–4)
GLUCOSE SERPL-MCNC: 138 MG/DL (ref 65–100)
HCT VFR BLD AUTO: 37.8 % (ref 35–47)
HGB BLD-MCNC: 12.3 G/DL (ref 11.5–16)
IMM GRANULOCYTES # BLD AUTO: 0 K/UL (ref 0–0.04)
IMM GRANULOCYTES NFR BLD AUTO: 0 % (ref 0–0.5)
LYMPHOCYTES # BLD: 0.7 K/UL (ref 0.8–3.5)
LYMPHOCYTES NFR BLD: 20 % (ref 12–49)
MCH RBC QN AUTO: 29.8 PG (ref 26–34)
MCHC RBC AUTO-ENTMCNC: 32.5 G/DL (ref 30–36.5)
MCV RBC AUTO: 91.5 FL (ref 80–99)
MONOCYTES # BLD: 0.6 K/UL (ref 0–1)
MONOCYTES NFR BLD: 17 % (ref 5–13)
NEUTS SEG # BLD: 2 K/UL (ref 1.8–8)
NEUTS SEG NFR BLD: 62 % (ref 32–75)
NRBC # BLD: 0 K/UL (ref 0–0.01)
NRBC BLD-RTO: 0 PER 100 WBC
PLATELET # BLD AUTO: 111 K/UL (ref 150–400)
PMV BLD AUTO: 12.6 FL (ref 8.9–12.9)
POTASSIUM SERPL-SCNC: 4.2 MMOL/L (ref 3.5–5.1)
PROT SERPL-MCNC: 6.5 G/DL (ref 6.4–8.2)
RBC # BLD AUTO: 4.13 M/UL (ref 3.8–5.2)
RBC MORPH BLD: ABNORMAL
SODIUM SERPL-SCNC: 142 MMOL/L (ref 136–145)
TROPONIN-HIGH SENSITIVITY: 10 NG/L (ref 0–51)
WBC # BLD AUTO: 3.3 K/UL (ref 3.6–11)

## 2022-12-07 PROCEDURE — 80053 COMPREHEN METABOLIC PANEL: CPT

## 2022-12-07 PROCEDURE — 71046 X-RAY EXAM CHEST 2 VIEWS: CPT

## 2022-12-07 PROCEDURE — 99285 EMERGENCY DEPT VISIT HI MDM: CPT

## 2022-12-07 PROCEDURE — 85025 COMPLETE CBC W/AUTO DIFF WBC: CPT

## 2022-12-07 PROCEDURE — 36415 COLL VENOUS BLD VENIPUNCTURE: CPT

## 2022-12-07 PROCEDURE — 84484 ASSAY OF TROPONIN QUANT: CPT

## 2022-12-07 PROCEDURE — 93005 ELECTROCARDIOGRAM TRACING: CPT

## 2022-12-07 NOTE — ED TRIAGE NOTES
Pt rpts Chills; Muscle pain; Severe headache; Cough; Joint pain; Shortness of breath; Sore throat; Fatigue; Runny nose. Negative Home COVID test this past Sunday and Negative Home COVID Tuesday. Intermittent fever/chills.

## 2022-12-07 NOTE — ED PROVIDER NOTES
72F w/ hx asthma and HLD p/w 2d viral syndrome. Pt reports 2d chills, body aches, faitgue, cough, sore throat, congestion and subjective F/C. Also reports right side chest tightness w/ coughing and SOB. Lightheaded but no syncope. Tested negative for COVID. Denies any prior cardiac hx. No recent surgeries, hospitalizations, travel, hx of malignancy, exogenous estrogen use, hemoptysis, LE pain/swelling, or hx of PE/DVT. Past Medical History:   Diagnosis Date    Arthritis     Hypercholesterolemia        Past Surgical History:   Procedure Laterality Date    HX BREAST LUMPECTOMY      HX ORTHOPAEDIC      bilateral Thumbs    HX ORTHOPAEDIC      Left lower Leg with hardware    HX ORTHOPAEDIC      Ramirez Neuroma    HX TONSIL AND ADENOIDECTOMY      HX WISDOM TEETH EXTRACTION           Family History:   Problem Relation Age of Onset    Heart Attack Mother 71        3V cabg    Diabetes Mother     Other Mother         cholesterol    Pancreatic Cancer Father 68    Diabetes Brother         complicated by leg amputation       Social History     Socioeconomic History    Marital status:      Spouse name: Not on file    Number of children: Not on file    Years of education: Not on file    Highest education level: Not on file   Occupational History    Not on file   Tobacco Use    Smoking status: Former     Packs/day: 1.50     Years: 23.00     Pack years: 34.50     Types: Cigarettes     Quit date: 12     Years since quittin.9    Smokeless tobacco: Never   Substance and Sexual Activity    Alcohol use:  Yes     Alcohol/week: 7.0 standard drinks     Types: 7 Cans of beer per week    Drug use: Never    Sexual activity: Yes     Partners: Male   Other Topics Concern    Not on file   Social History Narrative    Not on file     Social Determinants of Health     Financial Resource Strain: Not on file   Food Insecurity: Not on file   Transportation Needs: Not on file   Physical Activity: Not on file   Stress: Not on file Social Connections: Not on file   Intimate Partner Violence: Not on file   Housing Stability: Not on file         ALLERGIES: Prednisone, Cefazolin, Other medication, and Statins-hmg-coa reductase inhibitors    Review of Systems   Constitutional:  Positive for chills and fatigue. Negative for diaphoresis and fever. HENT:  Positive for rhinorrhea, sinus pain and sore throat. Negative for facial swelling, mouth sores, nosebleeds, trouble swallowing and voice change. Eyes:  Negative for pain and visual disturbance. Respiratory:  Positive for cough, chest tightness and shortness of breath. Negative for apnea, choking, wheezing and stridor. Cardiovascular:  Positive for chest pain. Negative for palpitations and leg swelling. Gastrointestinal:  Negative for abdominal distention, abdominal pain, blood in stool, diarrhea, nausea and vomiting. Genitourinary:  Negative for difficulty urinating, dysuria, flank pain, hematuria and pelvic pain. Musculoskeletal:  Negative for joint swelling. Skin:  Negative for color change and rash. Allergic/Immunologic: Negative for immunocompromised state. Neurological:  Negative for dizziness, seizures, syncope, speech difficulty and light-headedness. Hematological:  Does not bruise/bleed easily. Psychiatric/Behavioral:  Negative for agitation and behavioral problems. Vitals:    12/07/22 1558 12/07/22 1559 12/07/22 1602 12/07/22 1702   BP: (!) 155/82  (!) 158/77 (!) 148/86   Pulse: 77 67 (!) 59 (!) 59   Resp: 22 22 16 17   Temp:       SpO2:  97% 99% 96%   Weight:       Height:                Physical Exam  Vitals and nursing note reviewed. Constitutional:       General: She is not in acute distress. Appearance: Normal appearance. She is not ill-appearing or toxic-appearing. HENT:      Head: Normocephalic and atraumatic.       Right Ear: External ear normal.      Left Ear: External ear normal.      Nose: Nose normal.      Mouth/Throat:      Mouth: Mucous membranes are moist.      Pharynx: Oropharynx is clear. No oropharyngeal exudate or posterior oropharyngeal erythema. Eyes:      General: No scleral icterus. Extraocular Movements: Extraocular movements intact. Conjunctiva/sclera: Conjunctivae normal.      Pupils: Pupils are equal, round, and reactive to light. Cardiovascular:      Rate and Rhythm: Normal rate and regular rhythm. Pulses: Normal pulses. Heart sounds: Normal heart sounds. No murmur heard. No friction rub. No gallop. Pulmonary:      Effort: Pulmonary effort is normal. No respiratory distress. Breath sounds: Normal breath sounds. No stridor. No wheezing, rhonchi or rales. Abdominal:      General: There is no distension. Palpations: Abdomen is soft. Tenderness: There is no abdominal tenderness. There is no guarding or rebound. Musculoskeletal:         General: No tenderness or deformity. Normal range of motion. Cervical back: Normal range of motion and neck supple. No rigidity. Right lower leg: No edema. Left lower leg: No edema. Skin:     General: Skin is warm. Capillary Refill: Capillary refill takes less than 2 seconds. Coloration: Skin is not jaundiced. Neurological:      General: No focal deficit present. Mental Status: She is alert. Cranial Nerves: No cranial nerve deficit. Sensory: No sensory deficit. Motor: No weakness. Coordination: Coordination normal.   Psychiatric:         Mood and Affect: Mood normal.         Behavior: Behavior normal.         Thought Content:  Thought content normal.         Judgment: Judgment normal.        EKG Interpretation   SR, narrow QRS, nl intervals, no ALVAREZ/STD/TWI  (EKG tracing interpreted by ED physician)    LABORATORY TESTS:  Admission on 12/07/2022, Discharged on 12/07/2022   Component Date Value Ref Range Status    Ventricular Rate 12/07/2022 64  BPM Preliminary    Atrial Rate 12/07/2022 64  BPM Preliminary P-R Interval 12/07/2022 130  ms Preliminary    QRS Duration 12/07/2022 66  ms Preliminary    Q-T Interval 12/07/2022 400  ms Preliminary    QTC Calculation (Bezet) 12/07/2022 412  ms Preliminary    Calculated P Axis 12/07/2022 52  degrees Preliminary    Calculated R Axis 12/07/2022 32  degrees Preliminary    Calculated T Axis 12/07/2022 53  degrees Preliminary    Diagnosis 12/07/2022    Preliminary                    Value:Normal sinus rhythm  Septal infarct , age undetermined  Abnormal ECG  No previous ECGs available      Sodium 12/07/2022 142  136 - 145 mmol/L Final    Potassium 12/07/2022 4.2  3.5 - 5.1 mmol/L Final    Chloride 12/07/2022 106  97 - 108 mmol/L Final    CO2 12/07/2022 27  21 - 32 mmol/L Final    Anion gap 12/07/2022 9  5 - 15 mmol/L Final    Glucose 12/07/2022 138 (A)  65 - 100 mg/dL Final    BUN 12/07/2022 12  6 - 20 MG/DL Final    Creatinine 12/07/2022 0.71  0.55 - 1.02 MG/DL Final    BUN/Creatinine ratio 12/07/2022 17  12 - 20   Final    eGFR 12/07/2022 >60  >60 ml/min/1.73m2 Final    Comment:      Pediatric calculator link: [facility]. org/professionals/kdoqi/gfr_calculatorped       These results are not intended for use in patients <25years of age. eGFR results are calculated without a race factor using  the 2021 CKD-EPI equation. Careful clinical correlation is recommended, particularly when comparing to results calculated using previous equations. The CKD-EPI equation is less accurate in patients with extremes of muscle mass, extra-renal metabolism of creatinine, excessive creatine ingestion, or following therapy that affects renal tubular secretion. Calcium 12/07/2022 8.4 (A)  8.5 - 10.1 MG/DL Final    Bilirubin, total 12/07/2022 0.2  0.2 - 1.0 MG/DL Final    ALT (SGPT) 12/07/2022 27  12 - 78 U/L Final    AST (SGOT) 12/07/2022 20  15 - 37 U/L Final    Alk.  phosphatase 12/07/2022 50  45 - 117 U/L Final    Protein, total 12/07/2022 6.5  6.4 - 8.2 g/dL Final    Albumin 12/07/2022 3.5  3.5 - 5.0 g/dL Final    Globulin 12/07/2022 3.0  2.0 - 4.0 g/dL Final    A-G Ratio 12/07/2022 1.2  1.1 - 2.2   Final    WBC 12/07/2022 3.3 (A)  3.6 - 11.0 K/uL Final    RBC 12/07/2022 4.13  3.80 - 5.20 M/uL Final    HGB 12/07/2022 12.3  11.5 - 16.0 g/dL Final    HCT 12/07/2022 37.8  35.0 - 47.0 % Final    MCV 12/07/2022 91.5  80.0 - 99.0 FL Final    MCH 12/07/2022 29.8  26.0 - 34.0 PG Final    MCHC 12/07/2022 32.5  30.0 - 36.5 g/dL Final    RDW 12/07/2022 12.5  11.5 - 14.5 % Final    PLATELET 41/92/4389 822 (A)  150 - 400 K/uL Final    MPV 12/07/2022 12.6  8.9 - 12.9 FL Final    NRBC 12/07/2022 0.0  0.0  WBC Final    ABSOLUTE NRBC 12/07/2022 0.00  0.00 - 0.01 K/uL Final    NEUTROPHILS 12/07/2022 62  32 - 75 % Final    LYMPHOCYTES 12/07/2022 20  12 - 49 % Final    MONOCYTES 12/07/2022 17 (A)  5 - 13 % Final    EOSINOPHILS 12/07/2022 1  0 - 7 % Final    BASOPHILS 12/07/2022 0  0 - 1 % Final    IMMATURE GRANULOCYTES 12/07/2022 0  0 - 0.5 % Final    ABS. NEUTROPHILS 12/07/2022 2.0  1.8 - 8.0 K/UL Final    ABS. LYMPHOCYTES 12/07/2022 0.7 (A)  0.8 - 3.5 K/UL Final    ABS. MONOCYTES 12/07/2022 0.6  0.0 - 1.0 K/UL Final    ABS. EOSINOPHILS 12/07/2022 0.0  0.0 - 0.4 K/UL Final    ABS. BASOPHILS 12/07/2022 0.0  0.0 - 0.1 K/UL Final    ABS. IMM. GRANS. 12/07/2022 0.0  0.00 - 0.04 K/UL Final    DF 12/07/2022 SMEAR SCANNED    Final    RBC COMMENTS 12/07/2022 NORMOCYTIC, NORMOCHROMIC    Final    Troponin-High Sensitivity 12/07/2022 10  0 - 51 ng/L Final    Comment: A HS troponin value change of (+ or -) 50% or more below the 99th percentile, in a 1/2/3 hr interval represents a significant change. Clinical correcation is recommended. A HS troponin value change of (+ or -) 20% or above the 99th percentile, in a 1/2/3 hr interval represents a significant change. Clinical correlation is recommended.   99th Percentile:   Women: 0-51 ng/L                                                                Men:   0-76 ng/L  Patients taking more than 20 mg/day of biotin may havefalsely negative results and should not use this test.         IMAGING RESULTS:  XR CHEST PA LAT   Final Result   No acute cardiopulmonary disease. MEDICATIONS GIVEN:  Medications - No data to display    PROGRESS NOTE:   The patient's ED course has been uncomplicated    CONSULTS:  none    IMPRESSION:  1. Acute cough    2. Acute URI    3. Chest pain, unspecified type        PLAN:  - Discharge    Oumar David MD      MDM  Number of Diagnoses or Management Options  Acute cough  Acute URI  Chest pain, unspecified type  Diagnosis management comments: 72F w/ hx asthma and HLD p/w 2d viral syndrome. Pt well appearing, hemodynamically stable w/o resp distress. Reviewed work up ordered from triage. CXR unremarkable. Labs show mild leukopenia. Trop neg x1. EKG SR w/o ischemic changes. Low concern for sepsis or serious bacterial infection. No indication for abx at this time. Likely nonspecific viral process. Supportive care and symptomatic treatment. Patient given specific return precautions and explained signs/symptoms for which to come back to ED immediately but otherwise advised to f/u w/ PCP over next 2-3days.          Amount and/or Complexity of Data Reviewed  Clinical lab tests: reviewed  Tests in the radiology section of CPT®: reviewed  Tests in the medicine section of CPT®: reviewed  Independent visualization of images, tracings, or specimens: yes    Risk of Complications, Morbidity, and/or Mortality  Presenting problems: moderate  Diagnostic procedures: moderate  Management options: moderate           Procedures

## 2022-12-07 NOTE — ED NOTES
Pt ambulatory to the restroom; denies CP or worsening SOB with ambulation. V/S updated. Call bell within reach.

## 2022-12-11 LAB
ATRIAL RATE: 64 BPM
CALCULATED P AXIS, ECG09: 52 DEGREES
CALCULATED R AXIS, ECG10: 32 DEGREES
CALCULATED T AXIS, ECG11: 53 DEGREES
DIAGNOSIS, 93000: NORMAL
P-R INTERVAL, ECG05: 130 MS
Q-T INTERVAL, ECG07: 400 MS
QRS DURATION, ECG06: 66 MS
QTC CALCULATION (BEZET), ECG08: 412 MS
VENTRICULAR RATE, ECG03: 64 BPM

## 2022-12-15 ENCOUNTER — PATIENT MESSAGE (OUTPATIENT)
Dept: INTERNAL MEDICINE CLINIC | Age: 72
End: 2022-12-15

## 2022-12-15 DIAGNOSIS — G47.09 OTHER INSOMNIA: ICD-10-CM

## 2022-12-16 NOTE — TELEPHONE ENCOUNTER
From: Hoda Lala  To: Juan José Mendez MD  Sent: 12/10/2473 8:46 PM EST  Subject: Prescription for Ambien    I would appreciate it if you could send a new prescription to [facility] for Ambien. Thank you.   Frank Rawls

## 2022-12-20 RX ORDER — ZOLPIDEM TARTRATE 5 MG/1
5 TABLET ORAL
Qty: 90 TABLET | Refills: 1 | Status: SHIPPED | OUTPATIENT
Start: 2022-12-20

## 2022-12-28 ENCOUNTER — OFFICE VISIT (OUTPATIENT)
Dept: INTERNAL MEDICINE CLINIC | Age: 72
End: 2022-12-28
Payer: MEDICARE

## 2022-12-28 VITALS
HEIGHT: 62 IN | HEART RATE: 64 BPM | RESPIRATION RATE: 14 BRPM | SYSTOLIC BLOOD PRESSURE: 147 MMHG | WEIGHT: 135.4 LBS | DIASTOLIC BLOOD PRESSURE: 81 MMHG | TEMPERATURE: 97.5 F | OXYGEN SATURATION: 97 % | BODY MASS INDEX: 24.92 KG/M2

## 2022-12-28 DIAGNOSIS — Z85.3 HISTORY OF BREAST CANCER: ICD-10-CM

## 2022-12-28 DIAGNOSIS — G47.09 OTHER INSOMNIA: ICD-10-CM

## 2022-12-28 DIAGNOSIS — D69.6 THROMBOCYTOPENIA, UNSPECIFIED (HCC): ICD-10-CM

## 2022-12-28 DIAGNOSIS — M85.80 OSTEOPENIA, UNSPECIFIED LOCATION: ICD-10-CM

## 2022-12-28 DIAGNOSIS — J01.10 ACUTE NON-RECURRENT FRONTAL SINUSITIS: ICD-10-CM

## 2022-12-28 DIAGNOSIS — R79.9 ABNORMAL FINDING OF BLOOD CHEMISTRY, UNSPECIFIED: ICD-10-CM

## 2022-12-28 DIAGNOSIS — E78.00 HYPERCHOLESTEROLEMIA: ICD-10-CM

## 2022-12-28 DIAGNOSIS — J31.0 CHRONIC RHINITIS: ICD-10-CM

## 2022-12-28 DIAGNOSIS — Z00.00 MEDICARE ANNUAL WELLNESS VISIT, SUBSEQUENT: Primary | ICD-10-CM

## 2022-12-28 DIAGNOSIS — R79.89 ABNORMAL CBC: ICD-10-CM

## 2022-12-28 DIAGNOSIS — E83.51 HYPOCALCEMIA: ICD-10-CM

## 2022-12-28 DIAGNOSIS — F33.0 MILD EPISODE OF RECURRENT MAJOR DEPRESSIVE DISORDER (HCC): ICD-10-CM

## 2022-12-28 PROCEDURE — G8427 DOCREV CUR MEDS BY ELIG CLIN: HCPCS | Performed by: INTERNAL MEDICINE

## 2022-12-28 PROCEDURE — 99214 OFFICE O/P EST MOD 30 MIN: CPT | Performed by: INTERNAL MEDICINE

## 2022-12-28 PROCEDURE — 3017F COLORECTAL CA SCREEN DOC REV: CPT | Performed by: INTERNAL MEDICINE

## 2022-12-28 PROCEDURE — G0463 HOSPITAL OUTPT CLINIC VISIT: HCPCS | Performed by: INTERNAL MEDICINE

## 2022-12-28 PROCEDURE — 1090F PRES/ABSN URINE INCON ASSESS: CPT | Performed by: INTERNAL MEDICINE

## 2022-12-28 PROCEDURE — G8536 NO DOC ELDER MAL SCRN: HCPCS | Performed by: INTERNAL MEDICINE

## 2022-12-28 PROCEDURE — G8420 CALC BMI NORM PARAMETERS: HCPCS | Performed by: INTERNAL MEDICINE

## 2022-12-28 PROCEDURE — G9717 DOC PT DX DEP/BP F/U NT REQ: HCPCS | Performed by: INTERNAL MEDICINE

## 2022-12-28 PROCEDURE — G9899 SCRN MAM PERF RSLTS DOC: HCPCS | Performed by: INTERNAL MEDICINE

## 2022-12-28 PROCEDURE — G0439 PPPS, SUBSEQ VISIT: HCPCS | Performed by: INTERNAL MEDICINE

## 2022-12-28 PROCEDURE — 1101F PT FALLS ASSESS-DOCD LE1/YR: CPT | Performed by: INTERNAL MEDICINE

## 2022-12-28 PROCEDURE — G8399 PT W/DXA RESULTS DOCUMENT: HCPCS | Performed by: INTERNAL MEDICINE

## 2022-12-28 RX ORDER — AMOXICILLIN AND CLAVULANATE POTASSIUM 875; 125 MG/1; MG/1
1 TABLET, FILM COATED ORAL EVERY 12 HOURS
Qty: 10 TABLET | Refills: 0 | Status: SHIPPED | OUTPATIENT
Start: 2022-12-28 | End: 2023-01-02

## 2022-12-28 RX ORDER — CETIRIZINE HYDROCHLORIDE 10 MG/1
TABLET ORAL
Qty: 90 TABLET | Refills: 3 | Status: SHIPPED | OUTPATIENT
Start: 2022-12-28

## 2022-12-28 RX ORDER — ATORVASTATIN CALCIUM 10 MG/1
10 TABLET, FILM COATED ORAL DAILY
Qty: 90 TABLET | Refills: 3 | Status: SHIPPED | OUTPATIENT
Start: 2022-12-28

## 2022-12-28 RX ORDER — SERTRALINE HYDROCHLORIDE 50 MG/1
TABLET, FILM COATED ORAL
Qty: 90 TABLET | Refills: 3 | Status: SHIPPED | OUTPATIENT
Start: 2022-12-28

## 2022-12-28 RX ORDER — VITAMIN E 1000 UNIT
CAPSULE ORAL
COMMUNITY
Start: 2022-09-01

## 2022-12-28 NOTE — ASSESSMENT & PLAN NOTE
Declines most recent covid booster   Thinks had another PNA vaccine after her PCV13  Does have an advanced directive  Memory about the same   Previous VZV testing was negative

## 2022-12-28 NOTE — ASSESSMENT & PLAN NOTE
monitored by specialist. No acute findings meriting change in the plan   On allergy shots, has been on allergy shots for a long time  Zyrtec, Flonase

## 2022-12-28 NOTE — PATIENT INSTRUCTIONS
Medicare Wellness Visit, Female     The best way to live healthy is to have a lifestyle where you eat a well-balanced diet, exercise regularly, limit alcohol use, and quit all forms of tobacco/nicotine, if applicable. Regular preventive services are another way to keep healthy. Preventive services (vaccines, screening tests, monitoring & exams) can help personalize your care plan, which helps you manage your own care. Screening tests can find health problems at the earliest stages, when they are easiest to treat. [facility] follows the current, evidence-based guidelines published by the [facility] ([facility]) when recommending preventive services for our patients. Because we follow these guidelines, sometimes recommendations change over time as research supports it. (For example, mammograms used to be recommended annually. Even though Medicare will still pay for an annual mammogram, the newer guidelines recommend a mammogram every two years for women of average risk). Of course, you and your doctor may decide to screen more often for some diseases, based on your risk and your co-morbidities (chronic disease you are already diagnosed with). Preventive services for you include:  - Medicare offers their members a free annual wellness visit, which is time for you and your primary care provider to discuss and plan for your preventive service needs.  Take advantage of this benefit every year!    -Over the age of 72 should receive the recommended pneumonia vaccines.    -All adults should have a flu vaccine yearly.  -All adults should have a tetanus vaccine every 10 years.   -Over the age 48 should receive the shingles vaccines.        -All adults should be screened once for Hepatitis C.  -All adults age 38-68 who are overweight should have a diabetes screening test once every three years.   -Other screening tests and preventive services for persons with diabetes include: an eye exam to screen for diabetic retinopathy, a kidney function test, a foot exam, and stricter control over your cholesterol.   -Cardiovascular screening for adults with routine risk involves an electrocardiogram (ECG) at intervals determined by your doctor.     -Colorectal cancer screenings should be done for adults age 39-70 with no increased risk factors for colorectal cancer. There are a number of acceptable methods of screening for this type of cancer. Each test has its own benefits and drawbacks. Discuss with your doctor what is most appropriate for you during your annual wellness visit. The different tests include: colonoscopy (considered the best screening method), a fecal occult blood test, a fecal DNA test, and sigmoidoscopy.    -Lung cancer screening is recommended annually with a low dose CT scan for adults between age 54 and 68, who have smoked at least 30 pack years (equivalent of 1 pack per day for 30 days), and who is a current smoker or quit less than 15 years ago.    -A bone mass density test is recommended when a woman turns 65 to screen for osteoporosis. This test is only recommended one time, as a screening. Some providers will use this same test as a disease monitoring tool if you already have osteoporosis. -Breast cancer screenings are recommended every other year for women of normal risk, age 54-69.    -Cervical cancer screenings for women over age 72 are only recommended with certain risk factors.      Here is a list of your current Health Maintenance items (your personalized list of preventive services) with a due date:  Health Maintenance Due   Topic Date Due    Depression Monitoring  Never done    Pneumococcal Vaccine (3 - PPSV23 if available, else PCV20) 11/29/2019    COVID-19 Vaccine (3 - Booster for Ladene Diya series) 05/30/2021    Cholesterol Test   12/13/2022         Medicare Wellness Visit, Female     The best way to live healthy is to have a lifestyle where you eat a well-balanced diet, exercise regularly, limit alcohol use, and quit all forms of tobacco/nicotine, if applicable. Regular preventive services are another way to keep healthy. Preventive services (vaccines, screening tests, monitoring & exams) can help personalize your care plan, which helps you manage your own care. Screening tests can find health problems at the earliest stages, when they are easiest to treat. [facility] follows the current, evidence-based guidelines published by the [facility] ([facility]) when recommending preventive services for our patients. Because we follow these guidelines, sometimes recommendations change over time as research supports it. (For example, mammograms used to be recommended annually. Even though Medicare will still pay for an annual mammogram, the newer guidelines recommend a mammogram every two years for women of average risk). Of course, you and your doctor may decide to screen more often for some diseases, based on your risk and your co-morbidities (chronic disease you are already diagnosed with). Preventive services for you include:  - Medicare offers their members a free annual wellness visit, which is time for you and your primary care provider to discuss and plan for your preventive service needs.  Take advantage of this benefit every year!    -Over the age of 72 should receive the recommended pneumonia vaccines.    -All adults should have a flu vaccine yearly.  -All adults should have a tetanus vaccine every 10 years.   -Over the age 48 should receive the shingles vaccines.        -All adults should be screened once for Hepatitis C.  -All adults age 38-68 who are overweight should have a diabetes screening test once every three years.   -Other screening tests and preventive services for persons with diabetes include: an eye exam to screen for diabetic retinopathy, a kidney function test, a foot exam, and stricter control over your cholesterol.   -Cardiovascular screening for adults with routine risk involves an electrocardiogram (ECG) at intervals determined by your doctor.     -Colorectal cancer screenings should be done for adults age 39-70 with no increased risk factors for colorectal cancer. There are a number of acceptable methods of screening for this type of cancer. Each test has its own benefits and drawbacks. Discuss with your doctor what is most appropriate for you during your annual wellness visit. The different tests include: colonoscopy (considered the best screening method), a fecal occult blood test, a fecal DNA test, and sigmoidoscopy.    -Lung cancer screening is recommended annually with a low dose CT scan for adults between age 54 and 68, who have smoked at least 30 pack years (equivalent of 1 pack per day for 30 days), and who is a current smoker or quit less than 15 years ago.    -A bone mass density test is recommended when a woman turns 65 to screen for osteoporosis. This test is only recommended one time, as a screening. Some providers will use this same test as a disease monitoring tool if you already have osteoporosis. -Breast cancer screenings are recommended every other year for women of normal risk, age 54-69.    -Cervical cancer screenings for women over age 72 are only recommended with certain risk factors.      Here is a list of your current Health Maintenance items (your personalized list of preventive services) with a due date:  Health Maintenance Due   Topic Date Due    Depression Monitoring  Never done    Pneumococcal Vaccine (3 - PPSV23 if available, else PCV20) 11/29/2019    COVID-19 Vaccine (3 - Booster for Clarene Miguel series) 05/30/2021    Cholesterol Test   12/13/2022

## 2022-12-28 NOTE — ASSESSMENT & PLAN NOTE
monitored by specialist. No acute findings meriting change in the plan   Mammo scheduled in feb with onc

## 2022-12-28 NOTE — ASSESSMENT & PLAN NOTE
Symptoms started 12/4 - ran temp for 3 days   Right side of chest hurt when she coughed  Went to ER 12/7  In bed for a week   Since then has had increased sinus congestion/drainage  Was using xopenex several times a day because of wheezing/SOB, now just once a day  Using robitussin before bed  Possible sinus infection.   Augmentin sent to pharmacy

## 2022-12-28 NOTE — PROGRESS NOTES
Assessment and Plan     1. Medicare annual wellness visit, subsequent  Assessment & Plan:   Declines most recent covid booster   Thinks had another PNA vaccine after her PCV13  Does have an advanced directive  Memory about the same   Previous VZV testing was negative  2. Acute non-recurrent frontal sinusitis  Assessment & Plan:   Symptoms started 12/4 - ran temp for 3 days   Right side of chest hurt when she coughed  Went to ER 12/7  In bed for a week   Since then has had increased sinus congestion/drainage  Was using xopenex several times a day because of wheezing/SOB, now just once a day  Using robitussin before bed  Possible sinus infection. Augmentin sent to pharmacy  Orders:  -     amoxicillin-clavulanate (AUGMENTIN) 875-125 mg per tablet; Take 1 Tablet by mouth every twelve (12) hours for 5 days. , Normal, Disp-10 Tablet, R-0  3. Chronic rhinitis  Assessment & Plan:   monitored by specialist. No acute findings meriting change in the plan   On allergy shots, has been on allergy shots for a long time  Zyrtec, Flonase  Orders:  -     cetirizine (ZYRTEC) 10 mg tablet; TAKE 1 TABLET DAILY AS NEEDED FOR ALLERGIES, Normal, Disp-90 Tablet, R-3  4. Mild episode of recurrent major depressive disorder ([facility] [address]  Assessment & Plan:   well controlled, continue current medications   Sertraline 50  Orders:  -     sertraline (ZOLOFT) 50 mg tablet; TAKE 1 TABLET DAILY, Normal, Disp-90 Tablet, R-3  5. Hypercholesterolemia  Assessment & Plan:   well controlled, continue current medications   Atorvastatin 5 (she takes half of 10)  Orders:  -     METABOLIC PANEL, BASIC; Future  -     HEMOGLOBIN A1C WITH EAG; Future  -     LIPID PANEL; Future  -     atorvastatin (LIPITOR) 10 mg tablet; Take 1 Tablet by mouth daily. , Normal, Disp-90 Tablet, R-3YOUR PATIENT HAS REQUESTED A REFILL OF THIS MEDICATION, PREVIOUSLY AUTHORIZED BY ANOTHER PRESCRIBER.   6. Hypocalcemia  Assessment & Plan:   Noted last labs but was sick at that time  Orders:  - VITAMIN D, 25 HYDROXY; Future  7. Abnormal CBC  Assessment & Plan:   Noted last labs but was also sick at that time  Orders:  -     CBC WITH AUTOMATED DIFF; Future  8. Thrombocytopenia, unspecified  9. Abnormal finding of blood chemistry, unspecified   -     HEMOGLOBIN A1C WITH EAG; Future  10. Osteopenia, unspecified location  Assessment & Plan:   asymptomatic, continue current medications   Fosamax  Bone scan scheduled in feb  11. History of breast cancer  Assessment & Plan:   monitored by specialist. No acute findings meriting change in the plan   Mammo scheduled in feb with onc  12. Other insomnia  Assessment & Plan:  well controlled, continue current medications   Ambien 5 as needed       Benefits, risks, possible drug interactions, and side effects of all new medications were reviewed with the patient. Pt verbalized understanding. Return to clinic: 1 year for physical or earlier if needed    An electronic signature was used to authenticate this note. Juan José Mendez MD  [facility]  12/28/2022    No future appointments. History of Present Illness   Chief Complaint   Medicare wellness    Hoda Lala is a 81 y.o. female         Review of Systems   Constitutional:  Positive for chills and fever. HENT:  Negative for hearing loss. Eyes:  Negative for blurred vision. Respiratory:  Positive for shortness of breath. Cardiovascular:  Negative for chest pain. Gastrointestinal:  Negative for abdominal pain, blood in stool, constipation, diarrhea, melena, nausea and vomiting. Genitourinary:  Negative for dysuria and hematuria. Musculoskeletal:  Negative for joint pain. Skin:  Negative for rash. Neurological:  Negative for headaches.       Past Medical History     Allergies   Allergen Reactions    Prednisone Anaphylaxis and Shortness of Breath     Other reaction(s): anaphylaxis/angioedema      Cefazolin Cough and Itching     Other reaction(s): cough  Other reaction(s): Itching  Other reaction(s): Itching      Other Medication Other (comments)     Other reaction(s): unknown  Increased liver function        Current Outpatient Medications   Medication Sig    ascorbic acid, vitamin C, (VITAMIN C) 1,000 mg tablet     atorvastatin (LIPITOR) 10 mg tablet Take 1 Tablet by mouth daily. sertraline (ZOLOFT) 50 mg tablet TAKE 1 TABLET DAILY    cetirizine (ZYRTEC) 10 mg tablet TAKE 1 TABLET DAILY AS NEEDED FOR ALLERGIES    amoxicillin-clavulanate (AUGMENTIN) 875-125 mg per tablet Take 1 Tablet by mouth every twelve (12) hours for 5 days. zolpidem (AMBIEN) 5 mg tablet Take 1 Tablet by mouth nightly as needed (as needed). Max Daily Amount: 5 mg.    levalbuterol tartrate (Xopenex HFA) 45 mcg/actuation inhaler Take 2 Puffs by inhalation every four (4) hours as needed for Wheezing. fluticasone propionate (FLONASE) 50 mcg/actuation nasal spray USE 2 SPRAYS IN EACH NOSTRIL DAILY (INFLAMMATION OF THE NOSE DUE TO AN ALLERGY)    meloxicam (MOBIC) 15 mg tablet TAKE 1 TABLET DAILY    alendronate (FOSAMAX) 35 mg tablet Take 35 mg by mouth once. Once in a week    melatonin 10 mg capsule Take  by mouth nightly. exemestane (AROMASIN) 25 mg tablet Take 25 mg by mouth daily. nicotinic acid (NIACIN) 500 mg tablet Take 500 mg by mouth two (2) times daily (with meals). krill/om-3/dha/epa/phospho/ast (MEGARED OMEGA-3 KRILL OIL PO) Take  by mouth.    multivitamin (ONE A DAY) tablet Take 1 Tab by mouth daily. calcium carbonate/vitamin D2 (LIQUID CALCIUM PO) Take  by mouth. glucosamine-chondroitin (ARTHX) 500-400 mg cap Take 1 Cap by mouth daily. cholecalciferol (VITAMIN D3) 25 mcg (1,000 unit) cap Take 4,000 Units by mouth daily. magnesium oxide (MAG-OX) 400 mg tablet Take 400 mg by mouth daily. Indications: not taking    MILK THISTLE PO Take  by mouth. BIOTIN PO Take  by mouth. TURMERIC PO Take  by mouth.      No current facility-administered medications for this visit. Patient Active Problem List   Diagnosis Code    Mild intermittent asthma without complication Q25.94    Arthritis M19.90    Hypercholesterolemia E78.00    History of breast cancer Z85.3    Mild episode of recurrent major depressive disorder (HCC) F33.0    Chronic rhinitis J31.0    Other insomnia G47.09    Gastroesophageal reflux disease without esophagitis K21.9    Osteopenia M85.80    Elevated blood pressure reading R03.0    Medicare annual wellness visit, subsequent Z00.00    Memory problem R41. 3    Thrombocytopenia, unspecified D69.6    Acute non-recurrent frontal sinusitis J01.10    Abnormal CBC R79.89    Hypocalcemia E83.51     Past Surgical History:   Procedure Laterality Date    HX BREAST LUMPECTOMY      HX ORTHOPAEDIC      bilateral Thumbs    HX ORTHOPAEDIC      Left lower Leg with hardware    HX ORTHOPAEDIC      Ramirez Neuroma    HX TONSIL AND ADENOIDECTOMY      HX WISDOM TEETH EXTRACTION        Social History     Tobacco Use    Smoking status: Former     Packs/day: 1.50     Years: 23.00     Pack years: 34.50     Types: Cigarettes     Quit date: 1993     Years since quittin.3    Smokeless tobacco: Never   Substance Use Topics    Alcohol use:  Yes     Alcohol/week: 7.0 standard drinks     Types: 7 Cans of beer per week      Family History   Problem Relation Age of Onset    Heart Attack Mother 71        3V cabg    Diabetes Mother     Other Mother         cholesterol    OSTEOARTHRITIS Mother     Elevated Lipids Mother     Heart Disease Mother     Psychiatric Disorder Mother     Stroke Mother         After age 80    Pancreatic Cancer Father 68    Cancer Father         Pancreatic    Diabetes Brother         complicated by leg amputation        Physical Exam   Vitals:       Visit Vitals  BP (!) 147/81 (BP 1 Location: Left upper arm, BP Patient Position: Sitting, BP Cuff Size: Small adult)   Pulse 64   Temp 97.5 °F (36.4 °C) (Oral)   Resp 14   Ht 5' 2\" (1.575 m)   Wt 135 lb 6.4 oz (61.4 kg) SpO2 97%   BMI 24.76 kg/m²        Physical Exam  Constitutional:       General: She is not in acute distress. Appearance: She is well-developed. HENT:      Right Ear: Tympanic membrane, ear canal and external ear normal.      Left Ear: Tympanic membrane, ear canal and external ear normal.      Nose:      Comments: Nasally voice     Mouth/Throat:      Mouth: Mucous membranes are moist.      Pharynx: No posterior oropharyngeal erythema. Eyes:      Extraocular Movements: Extraocular movements intact. Conjunctiva/sclera: Conjunctivae normal.   Neck:      Vascular: No carotid bruit. Cardiovascular:      Rate and Rhythm: Normal rate and regular rhythm. Pulses: Normal pulses. Heart sounds: No murmur heard. No friction rub. No gallop. Pulmonary:      Effort: No respiratory distress. Breath sounds: No wheezing, rhonchi or rales. Abdominal:      General: Bowel sounds are normal. There is no distension. Palpations: Abdomen is soft. There is no hepatomegaly, splenomegaly or mass. Tenderness: There is no abdominal tenderness. There is no guarding. Musculoskeletal:      Cervical back: Neck supple. Right lower leg: No edema. Left lower leg: No edema. Lymphadenopathy:      Cervical: No cervical adenopathy. Skin:     General: Skin is warm. Findings: No rash. Neurological:      Mental Status: She is alert. This is the Subsequent Medicare Annual Wellness Exam, performed 12 months or more after the Initial AWV or the last Subsequent AWV    I have reviewed the patient's medical history in detail and updated the computerized patient record. Assessment/Plan   Education and counseling provided:  Are appropriate based on today's review and evaluation    1.  Medicare annual wellness visit, subsequent  Assessment & Plan:   Declines most recent covid booster   Thinks had another PNA vaccine after her PCV13  Does have an advanced directive  Memory about the same   Previous VZV testing was negative  2. Acute non-recurrent frontal sinusitis  Assessment & Plan:   Symptoms started 12/4 - ran temp for 3 days   Right side of chest hurt when she coughed  Went to ER 12/7  In bed for a week   Since then has had increased sinus congestion/drainage  Was using xopenex several times a day because of wheezing/SOB, now just once a day  Using robitussin before bed  Possible sinus infection. Augmentin sent to pharmacy  Orders:  -     amoxicillin-clavulanate (AUGMENTIN) 875-125 mg per tablet; Take 1 Tablet by mouth every twelve (12) hours for 5 days. , Normal, Disp-10 Tablet, R-0  3. Chronic rhinitis  Assessment & Plan:   monitored by specialist. No acute findings meriting change in the plan   On allergy shots, has been on allergy shots for a long time  Zyrtec, Flonase  Orders:  -     cetirizine (ZYRTEC) 10 mg tablet; TAKE 1 TABLET DAILY AS NEEDED FOR ALLERGIES, Normal, Disp-90 Tablet, R-3  4. Mild episode of recurrent major depressive disorder ([facility] [address]  Assessment & Plan:   well controlled, continue current medications   Sertraline 50  Orders:  -     sertraline (ZOLOFT) 50 mg tablet; TAKE 1 TABLET DAILY, Normal, Disp-90 Tablet, R-3  5. Hypercholesterolemia  Assessment & Plan:   well controlled, continue current medications   Atorvastatin 5 (she takes half of 10)  Orders:  -     METABOLIC PANEL, BASIC; Future  -     HEMOGLOBIN A1C WITH EAG; Future  -     LIPID PANEL; Future  -     atorvastatin (LIPITOR) 10 mg tablet; Take 1 Tablet by mouth daily. , Normal, Disp-90 Tablet, R-3YOUR PATIENT HAS REQUESTED A REFILL OF THIS MEDICATION, PREVIOUSLY AUTHORIZED BY ANOTHER PRESCRIBER. 6. Hypocalcemia  Assessment & Plan:   Noted last labs but was sick at that time  Orders:  -     VITAMIN D, 25 HYDROXY; Future  7. Abnormal CBC  Assessment & Plan:   Noted last labs but was also sick at that time  Orders:  -     CBC WITH AUTOMATED DIFF; Future  8. Thrombocytopenia, unspecified  9.  Abnormal finding of blood chemistry, unspecified   -     HEMOGLOBIN A1C WITH EAG; Future  10. Osteopenia, unspecified location  Assessment & Plan:   asymptomatic, continue current medications   Fosamax  Bone scan scheduled in feb  11. History of breast cancer  Assessment & Plan:   monitored by specialist. No acute findings meriting change in the plan   Mammo scheduled in feb with onc  12. Other insomnia  Assessment & Plan:  well controlled, continue current medications   Ambien 5 as needed     Depression Risk Factor Screening     3 most recent PHQ Screens 12/28/2022   Little interest or pleasure in doing things Not at all   Feeling down, depressed, irritable, or hopeless Not at all   Total Score PHQ 2 0       Alcohol & Drug Abuse Risk Screen    Do you average more than 1 drink per night or more than 7 drinks a week:  No    On any one occasion in the past three months have you have had more than 3 drinks containing alcohol:  No          Functional Ability and Level of Safety    Hearing:  decreased      Activities of Daily Living: The home contains: handrails and grab bars  Patient does total self care      Ambulation: with no difficulty     Fall Risk:  Fall Risk Assessment, last 12 mths 12/28/2022   Able to walk? Yes   Fall in past 12 months? 0   Do you feel unsteady? 0   Are you worried about falling 0   Number of falls in past 12 months -   Fall with injury?  -      Abuse Screen:  Patient is not abused       Cognitive Screening    Has your family/caregiver stated any concerns about your memory: no         Health Maintenance Due     Health Maintenance Due   Topic Date Due    Depression Monitoring  Never done    Pneumococcal 65+ years (3 - PPSV23 if available, else PCV20) 11/29/2019    COVID-19 Vaccine (3 - Booster for Marysol Most series) 05/30/2021    Lipid Screen  12/13/2022       Patient Care Team   Patient Care Team:  Jose Lopez MD as PCP - General (Internal Medicine Physician)  Jose Lopez MD as PCP - [facility] Empaneled Provider    History     Patient Active Problem List   Diagnosis Code    Mild intermittent asthma without complication L38.95    Arthritis M19.90    Hypercholesterolemia E78.00    History of breast cancer Z85.3    Mild episode of recurrent major depressive disorder (HCC) F33.0    Chronic rhinitis J31.0    Other insomnia G47.09    Gastroesophageal reflux disease without esophagitis K21.9    Osteopenia M85.80    Elevated blood pressure reading R03.0    Medicare annual wellness visit, subsequent Z00.00    Memory problem R41. 3    Thrombocytopenia, unspecified D69.6    Acute non-recurrent frontal sinusitis J01.10    Abnormal CBC R79.89    Hypocalcemia E83.51     Past Medical History:   Diagnosis Date    Arthritis     Hypercholesterolemia       Past Surgical History:   Procedure Laterality Date    HX BREAST LUMPECTOMY      HX ORTHOPAEDIC      bilateral Thumbs    HX ORTHOPAEDIC      Left lower Leg with hardware    HX ORTHOPAEDIC      Ramirez Neuroma    HX TONSIL AND ADENOIDECTOMY      HX WISDOM TEETH EXTRACTION       Current Outpatient Medications   Medication Sig Dispense Refill    ascorbic acid, vitamin C, (VITAMIN C) 1,000 mg tablet       atorvastatin (LIPITOR) 10 mg tablet Take 1 Tablet by mouth daily. 90 Tablet 3    sertraline (ZOLOFT) 50 mg tablet TAKE 1 TABLET DAILY 90 Tablet 3    cetirizine (ZYRTEC) 10 mg tablet TAKE 1 TABLET DAILY AS NEEDED FOR ALLERGIES 90 Tablet 3    amoxicillin-clavulanate (AUGMENTIN) 875-125 mg per tablet Take 1 Tablet by mouth every twelve (12) hours for 5 days. 10 Tablet 0    zolpidem (AMBIEN) 5 mg tablet Take 1 Tablet by mouth nightly as needed (as needed). Max Daily Amount: 5 mg. 90 Tablet 1    levalbuterol tartrate (Xopenex HFA) 45 mcg/actuation inhaler Take 2 Puffs by inhalation every four (4) hours as needed for Wheezing.  1 Each 11    fluticasone propionate (FLONASE) 50 mcg/actuation nasal spray USE 2 SPRAYS IN EACH NOSTRIL DAILY (INFLAMMATION OF THE NOSE DUE TO AN ALLERGY) 48 g 3    meloxicam (MOBIC) 15 mg tablet TAKE 1 TABLET DAILY 90 Tablet 3    alendronate (FOSAMAX) 35 mg tablet Take 35 mg by mouth once. Once in a week      melatonin 10 mg capsule Take  by mouth nightly. exemestane (AROMASIN) 25 mg tablet Take 25 mg by mouth daily. nicotinic acid (NIACIN) 500 mg tablet Take 500 mg by mouth two (2) times daily (with meals). krill/om-3/dha/epa/phospho/ast (MEGARED OMEGA-3 KRILL OIL PO) Take  by mouth.      multivitamin (ONE A DAY) tablet Take 1 Tab by mouth daily. calcium carbonate/vitamin D2 (LIQUID CALCIUM PO) Take  by mouth. glucosamine-chondroitin (ARTHX) 500-400 mg cap Take 1 Cap by mouth daily. cholecalciferol (VITAMIN D3) 25 mcg (1,000 unit) cap Take 4,000 Units by mouth daily. magnesium oxide (MAG-OX) 400 mg tablet Take 400 mg by mouth daily. Indications: not taking      MILK THISTLE PO Take  by mouth. BIOTIN PO Take  by mouth. TURMERIC PO Take  by mouth.        Allergies   Allergen Reactions    Prednisone Anaphylaxis and Shortness of Breath     Other reaction(s): anaphylaxis/angioedema      Cefazolin Cough and Itching     Other reaction(s): cough  Other reaction(s): Itching  Other reaction(s): Itching      Other Medication Other (comments)     Other reaction(s): unknown  Increased liver function       Family History   Problem Relation Age of Onset    Heart Attack Mother 71        3V cabg    Diabetes Mother     Other Mother         cholesterol    OSTEOARTHRITIS Mother     Elevated Lipids Mother     Heart Disease Mother     Psychiatric Disorder Mother     Stroke Mother         After age 80    Pancreatic Cancer Father 68    Cancer Father         Pancreatic    Diabetes Brother         complicated by leg amputation     Social History     Tobacco Use    Smoking status: Former     Packs/day: 1.50     Years: 23.00     Pack years: 34.50     Types: Cigarettes     Quit date: 1993     Years since quittin.3    Smokeless tobacco: Never Substance Use Topics    Alcohol use:  Yes     Alcohol/week: 7.0 standard drinks     Types: 7 Cans of beer per week         Silvio Hawkins MD

## 2022-12-29 LAB
25(OH)D3 SERPL-MCNC: 69.9 NG/ML (ref 30–100)
ANION GAP SERPL CALC-SCNC: 4 MMOL/L (ref 5–15)
BASOPHILS # BLD: 0 K/UL (ref 0–0.1)
BASOPHILS NFR BLD: 1 % (ref 0–1)
BUN SERPL-MCNC: 13 MG/DL (ref 6–20)
BUN/CREAT SERPL: 18 (ref 12–20)
CALCIUM SERPL-MCNC: 8.9 MG/DL (ref 8.5–10.1)
CHLORIDE SERPL-SCNC: 108 MMOL/L (ref 97–108)
CHOLEST SERPL-MCNC: 172 MG/DL
CO2 SERPL-SCNC: 30 MMOL/L (ref 21–32)
CREAT SERPL-MCNC: 0.71 MG/DL (ref 0.55–1.02)
DIFFERENTIAL METHOD BLD: NORMAL
EOSINOPHIL # BLD: 0.2 K/UL (ref 0–0.4)
EOSINOPHIL NFR BLD: 4 % (ref 0–7)
ERYTHROCYTE [DISTWIDTH] IN BLOOD BY AUTOMATED COUNT: 12.2 % (ref 11.5–14.5)
EST. AVERAGE GLUCOSE BLD GHB EST-MCNC: 120 MG/DL
GLUCOSE SERPL-MCNC: 104 MG/DL (ref 65–100)
HBA1C MFR BLD: 5.8 % (ref 4–5.6)
HCT VFR BLD AUTO: 41.6 % (ref 35–47)
HDLC SERPL-MCNC: 53 MG/DL
HDLC SERPL: 3.2 {RATIO} (ref 0–5)
HGB BLD-MCNC: 13.3 G/DL (ref 11.5–16)
IMM GRANULOCYTES # BLD AUTO: 0 K/UL (ref 0–0.04)
IMM GRANULOCYTES NFR BLD AUTO: 0 % (ref 0–0.5)
LDLC SERPL CALC-MCNC: 94.2 MG/DL (ref 0–100)
LYMPHOCYTES # BLD: 1.6 K/UL (ref 0.8–3.5)
LYMPHOCYTES NFR BLD: 30 % (ref 12–49)
MCH RBC QN AUTO: 29.5 PG (ref 26–34)
MCHC RBC AUTO-ENTMCNC: 32 G/DL (ref 30–36.5)
MCV RBC AUTO: 92.2 FL (ref 80–99)
MONOCYTES # BLD: 0.6 K/UL (ref 0–1)
MONOCYTES NFR BLD: 11 % (ref 5–13)
NEUTS SEG # BLD: 2.9 K/UL (ref 1.8–8)
NEUTS SEG NFR BLD: 54 % (ref 32–75)
NRBC # BLD: 0 K/UL (ref 0–0.01)
NRBC BLD-RTO: 0 PER 100 WBC
PLATELET # BLD AUTO: 168 K/UL (ref 150–400)
POTASSIUM SERPL-SCNC: 4.2 MMOL/L (ref 3.5–5.1)
RBC # BLD AUTO: 4.51 M/UL (ref 3.8–5.2)
SODIUM SERPL-SCNC: 142 MMOL/L (ref 136–145)
TRIGL SERPL-MCNC: 124 MG/DL (ref ?–150)
VLDLC SERPL CALC-MCNC: 24.8 MG/DL
WBC # BLD AUTO: 5.3 K/UL (ref 3.6–11)

## 2022-12-31 NOTE — PROGRESS NOTES
Letter sent. LDL 94. A1c 5.8. BMP normal.  Vitamin D 69.   CBC normal.  CBC abnormalities resolved    Continue atorvastatin  Prediabetes-monitor

## 2023-02-13 LAB — MAMMOGRAPHY, EXTERNAL: NORMAL

## 2023-02-23 ENCOUNTER — TELEPHONE (OUTPATIENT)
Dept: FAMILY MEDICINE CLINIC | Age: 73
End: 2023-02-23

## 2023-02-23 NOTE — TELEPHONE ENCOUNTER
----- Message from [place] [employer] sent at 2/23/2023  1:34 PM EST -----  Subject: Appointment Request    Reason for Call: New Patient/New to Provider Appointment needed: New   Patient Request Appointment    QUESTIONS    Reason for appointment request? Requested Provider unavailable - [place]     Additional Information for Provider?  Establish care, Pt is requesting to   establish care with Dr. Talha Hall due to her PCP Dr. Son Mckay leaving the   [address] practice and would like to be notified   of first available appt.  ---------------------------------------------------------------------------  --------------  Bailee LONG  [phone]; OK to leave message on voicemail,OK to respond with electronic   message via [employer] portal (only for patients who have registered [employer]   account)  ---------------------------------------------------------------------------  --------------  SCRIPT ANSWERS  COVID Screen: Cecille Lambert

## 2023-05-08 SDOH — HEALTH STABILITY: PHYSICAL HEALTH: ON AVERAGE, HOW MANY MINUTES DO YOU ENGAGE IN EXERCISE AT THIS LEVEL?: 30 MIN

## 2023-05-08 SDOH — HEALTH STABILITY: PHYSICAL HEALTH: ON AVERAGE, HOW MANY DAYS PER WEEK DO YOU ENGAGE IN MODERATE TO STRENUOUS EXERCISE (LIKE A BRISK WALK)?: 3 DAYS

## 2023-05-08 ASSESSMENT — SOCIAL DETERMINANTS OF HEALTH (SDOH)
WITHIN THE LAST YEAR, HAVE TO BEEN RAPED OR FORCED TO HAVE ANY KIND OF SEXUAL ACTIVITY BY YOUR PARTNER OR EX-PARTNER?: NO
WITHIN THE LAST YEAR, HAVE YOU BEEN HUMILIATED OR EMOTIONALLY ABUSED IN OTHER WAYS BY YOUR PARTNER OR EX-PARTNER?: NO
WITHIN THE LAST YEAR, HAVE YOU BEEN KICKED, HIT, SLAPPED, OR OTHERWISE PHYSICALLY HURT BY YOUR PARTNER OR EX-PARTNER?: NO
WITHIN THE LAST YEAR, HAVE YOU BEEN AFRAID OF YOUR PARTNER OR EX-PARTNER?: NO

## 2023-05-09 ENCOUNTER — OFFICE VISIT (OUTPATIENT)
Age: 73
End: 2023-05-09
Payer: MEDICARE

## 2023-05-09 VITALS
SYSTOLIC BLOOD PRESSURE: 115 MMHG | RESPIRATION RATE: 16 BRPM | DIASTOLIC BLOOD PRESSURE: 72 MMHG | OXYGEN SATURATION: 96 % | WEIGHT: 133.4 LBS | HEIGHT: 62 IN | HEART RATE: 64 BPM | BODY MASS INDEX: 24.55 KG/M2 | TEMPERATURE: 98.1 F

## 2023-05-09 DIAGNOSIS — R03.0 ELEVATED BLOOD PRESSURE READING: ICD-10-CM

## 2023-05-09 DIAGNOSIS — F33.0 MILD EPISODE OF RECURRENT MAJOR DEPRESSIVE DISORDER (HCC): ICD-10-CM

## 2023-05-09 DIAGNOSIS — E78.00 HYPERCHOLESTEROLEMIA: Primary | ICD-10-CM

## 2023-05-09 PROCEDURE — 99203 OFFICE O/P NEW LOW 30 MIN: CPT | Performed by: NURSE PRACTITIONER

## 2023-05-09 PROCEDURE — 1123F ACP DISCUSS/DSCN MKR DOCD: CPT | Performed by: NURSE PRACTITIONER

## 2023-05-09 RX ORDER — LEVALBUTEROL TARTRATE 45 UG/1
AEROSOL, METERED ORAL PRN
COMMUNITY

## 2023-05-09 RX ORDER — EXEMESTANE 25 MG/1
TABLET ORAL
COMMUNITY
Start: 2019-08-02

## 2023-05-09 RX ORDER — CYCLOSPORINE 0.5 MG/ML
EMULSION OPHTHALMIC
COMMUNITY
Start: 2023-04-26

## 2023-05-09 RX ORDER — ZOLPIDEM TARTRATE 5 MG/1
TABLET ORAL
COMMUNITY
Start: 2023-03-19

## 2023-05-09 RX ORDER — ATORVASTATIN CALCIUM 10 MG/1
TABLET, FILM COATED ORAL
COMMUNITY
Start: 2018-10-25

## 2023-05-09 RX ORDER — NIACIN 500 MG/1
500 TABLET, EXTENDED RELEASE ORAL 3 TIMES DAILY
COMMUNITY

## 2023-05-09 RX ORDER — ALENDRONATE SODIUM 35 MG/1
TABLET ORAL
COMMUNITY
Start: 2022-07-05

## 2023-05-09 RX ORDER — FLUTICASONE PROPIONATE 50 MCG
SPRAY, SUSPENSION (ML) NASAL
COMMUNITY
Start: 2018-09-17

## 2023-05-09 RX ORDER — MELATONIN 10 MG
CAPSULE ORAL
COMMUNITY

## 2023-05-09 RX ORDER — GLUC/MSM/COLGN2/HYAL/ANTIARTH3 375-375-20
TABLET ORAL
COMMUNITY
End: 2023-05-09

## 2023-05-09 RX ORDER — MAGNESIUM CARB/ALUMINUM HYDROX 105-160MG
2 TABLET,CHEWABLE ORAL 2 TIMES DAILY
COMMUNITY
Start: 2014-09-03

## 2023-05-09 RX ORDER — MAGNESIUM OXIDE 400 MG/1
400 TABLET ORAL DAILY
COMMUNITY

## 2023-05-09 RX ORDER — CETIRIZINE HYDROCHLORIDE 10 MG/1
TABLET ORAL
COMMUNITY
Start: 2014-08-07

## 2023-05-09 RX ORDER — MELOXICAM 15 MG/1
TABLET ORAL
COMMUNITY
Start: 2019-03-23

## 2023-05-09 ASSESSMENT — PATIENT HEALTH QUESTIONNAIRE - PHQ9
SUM OF ALL RESPONSES TO PHQ QUESTIONS 1-9: 0
2. FEELING DOWN, DEPRESSED OR HOPELESS: 0
SUM OF ALL RESPONSES TO PHQ QUESTIONS 1-9: 0
SUM OF ALL RESPONSES TO PHQ9 QUESTIONS 1 & 2: 0
SUM OF ALL RESPONSES TO PHQ QUESTIONS 1-9: 0
1. LITTLE INTEREST OR PLEASURE IN DOING THINGS: 0
SUM OF ALL RESPONSES TO PHQ QUESTIONS 1-9: 0

## 2023-05-09 ASSESSMENT — ENCOUNTER SYMPTOMS
ABDOMINAL PAIN: 0
BACK PAIN: 0
DIARRHEA: 0
CONSTIPATION: 0
NAUSEA: 0
GASTROINTESTINAL NEGATIVE: 1
VOMITING: 0
BLOOD IN STOOL: 0
EYE REDNESS: 0
SHORTNESS OF BREATH: 0
RESPIRATORY NEGATIVE: 1
SINUS PAIN: 0
CHEST TIGHTNESS: 0
COUGH: 0
EYE PAIN: 0
SINUS PRESSURE: 0
EYES NEGATIVE: 1
RHINORRHEA: 0

## 2023-05-09 NOTE — PROGRESS NOTES
melatonin 10 MG CAPS capsule Take by mouth    meloxicam (MOBIC) 15 MG tablet     niacin (NIASPAN) 500 MG extended release tablet Take 1 tablet by mouth 3 times daily    sertraline (ZOLOFT) 50 MG tablet sertraline 50 mg tablet    zolpidem (AMBIEN) 5 MG tablet     Multiple Vitamins-Minerals (OCUVITE ADULT FORMULA PO) Take by mouth    Probiotic Product (PROBIOTIC DIGESTIVE SUPP PO) Take by mouth     No current facility-administered medications for this visit. Patient Active Problem List   Diagnosis    Gastroesophageal reflux disease without esophagitis    Arthritis    Other insomnia    Elevated blood pressure reading    Chronic rhinitis    Mild intermittent asthma without complication    Osteopenia    Hypercholesterolemia    Medicare annual wellness visit, subsequent    Memory problem    History of breast cancer    Thrombocytopenia, unspecified (HCC)    Mild episode of recurrent major depressive disorder ([facility] [address]    Acute non-recurrent frontal sinusitis    Abnormal CBC    Hypocalcemia     Past Surgical History:   Procedure Laterality Date    BREAST LUMPECTOMY      ORTHOPEDIC SURGERY      Rubin Neuroma    ORTHOPEDIC SURGERY      bilateral Thumbs    ORTHOPEDIC SURGERY      Left lower Leg with hardware    TONSILLECTOMY AND ADENOIDECTOMY      WISDOM TOOTH EXTRACTION        Social History     Tobacco Use    Smoking status: Former     Packs/day: 1.50     Types: Cigarettes     Quit date: 1993     Years since quittin.6    Smokeless tobacco: Never   Substance Use Topics    Alcohol use:  Yes     Alcohol/week: 7.0 standard drinks      Family History   Problem Relation Age of Onset    Other Mother         cholesterol    Diabetes Mother     Heart Attack Mother 71        3V cabg    Osteoarthritis Mother     Elevated Lipids Mother     Heart Disease Mother     Psychiatric Disorder Mother     Stroke Mother         After age 80    Pancreatic Cancer Father 68    Cancer Father         Pancreatic    Diabetes Brother

## 2023-05-11 DIAGNOSIS — E78.00 HYPERCHOLESTEROLEMIA: ICD-10-CM

## 2023-05-12 LAB
BASOPHILS # BLD: 0 K/UL (ref 0–0.1)
BASOPHILS NFR BLD: 1 % (ref 0–1)
DIFFERENTIAL METHOD BLD: ABNORMAL
EOSINOPHIL # BLD: 0.2 K/UL (ref 0–0.4)
EOSINOPHIL NFR BLD: 5 % (ref 0–7)
ERYTHROCYTE [DISTWIDTH] IN BLOOD BY AUTOMATED COUNT: 12.5 % (ref 11.5–14.5)
HCT VFR BLD AUTO: 42.4 % (ref 35–47)
HGB BLD-MCNC: 13.3 G/DL (ref 11.5–16)
IMM GRANULOCYTES # BLD AUTO: 0 K/UL (ref 0–0.04)
IMM GRANULOCYTES NFR BLD AUTO: 0 % (ref 0–0.5)
LYMPHOCYTES # BLD: 1.3 K/UL (ref 0.8–3.5)
LYMPHOCYTES NFR BLD: 33 % (ref 12–49)
MCH RBC QN AUTO: 29.3 PG (ref 26–34)
MCHC RBC AUTO-ENTMCNC: 31.4 G/DL (ref 30–36.5)
MCV RBC AUTO: 93.4 FL (ref 80–99)
MONOCYTES # BLD: 0.5 K/UL (ref 0–1)
MONOCYTES NFR BLD: 13 % (ref 5–13)
NEUTS SEG # BLD: 1.9 K/UL (ref 1.8–8)
NEUTS SEG NFR BLD: 48 % (ref 32–75)
NRBC # BLD: 0 K/UL (ref 0–0.01)
NRBC BLD-RTO: 0 PER 100 WBC
PLATELET # BLD AUTO: 143 K/UL (ref 150–400)
RBC # BLD AUTO: 4.54 M/UL (ref 3.8–5.2)
WBC # BLD AUTO: 4 K/UL (ref 3.6–11)

## 2023-05-16 RX ORDER — NIACIN 500 MG
500 TABLET ORAL 2 TIMES DAILY WITH MEALS
COMMUNITY

## 2023-05-16 RX ORDER — SODIUM PHOSPHATE,MONO-DIBASIC 19G-7G/118
1 ENEMA (ML) RECTAL DAILY
COMMUNITY

## 2023-05-16 RX ORDER — LEVALBUTEROL TARTRATE 45 UG/1
2 AEROSOL, METERED ORAL EVERY 4 HOURS PRN
COMMUNITY
Start: 2022-09-14

## 2023-06-15 LAB
ALBUMIN SERPL-MCNC: 4.1 G/DL (ref 3.5–5)
ALBUMIN/GLOB SERPL: 1.5 (ref 1.1–2.2)
ALP SERPL-CCNC: 55 U/L (ref 45–117)
ALT SERPL-CCNC: 34 U/L (ref 12–78)
ANION GAP SERPL CALC-SCNC: 1 MMOL/L (ref 5–15)
AST SERPL-CCNC: 22 U/L (ref 15–37)
BILIRUB SERPL-MCNC: 0.5 MG/DL (ref 0.2–1)
BUN SERPL-MCNC: 19 MG/DL (ref 6–20)
BUN/CREAT SERPL: 25 (ref 12–20)
CALCIUM SERPL-MCNC: 8.9 MG/DL (ref 8.5–10.1)
CHLORIDE SERPL-SCNC: 111 MMOL/L (ref 97–108)
CHOLEST SERPL-MCNC: 182 MG/DL
CO2 SERPL-SCNC: 30 MMOL/L (ref 21–32)
CREAT SERPL-MCNC: 0.76 MG/DL (ref 0.55–1.02)
GLOBULIN SER CALC-MCNC: 2.7 G/DL (ref 2–4)
GLUCOSE SERPL-MCNC: 94 MG/DL (ref 65–100)
HDLC SERPL-MCNC: 56 MG/DL
HDLC SERPL: 3.3 (ref 0–5)
LDLC SERPL CALC-MCNC: 104.6 MG/DL (ref 0–100)
POTASSIUM SERPL-SCNC: 4.3 MMOL/L (ref 3.5–5.1)
PROT SERPL-MCNC: 6.8 G/DL (ref 6.4–8.2)
SODIUM SERPL-SCNC: 142 MMOL/L (ref 136–145)
TRIGL SERPL-MCNC: 107 MG/DL
VLDLC SERPL CALC-MCNC: 21.4 MG/DL

## 2023-06-20 DIAGNOSIS — G47.00 INSOMNIA, UNSPECIFIED TYPE: Primary | ICD-10-CM

## 2023-06-20 RX ORDER — ZOLPIDEM TARTRATE 5 MG/1
5 TABLET ORAL NIGHTLY PRN
Qty: 60 TABLET | Refills: 0 | Status: SHIPPED | OUTPATIENT
Start: 2023-06-20 | End: 2023-08-19

## 2023-07-02 RX ORDER — FLUTICASONE PROPIONATE 50 MCG
SPRAY, SUSPENSION (ML) NASAL
Qty: 3 EACH | Refills: 3 | Status: SHIPPED | OUTPATIENT
Start: 2023-07-02

## 2023-08-10 RX ORDER — MELOXICAM 15 MG/1
TABLET ORAL
Qty: 90 TABLET | Refills: 3 | Status: SHIPPED | OUTPATIENT
Start: 2023-08-10

## 2023-08-23 ENCOUNTER — PATIENT MESSAGE (OUTPATIENT)
Age: 73
End: 2023-08-23

## 2023-08-23 DIAGNOSIS — G47.09 OTHER INSOMNIA: Primary | ICD-10-CM

## 2023-08-24 RX ORDER — ZOLPIDEM TARTRATE 5 MG/1
5 TABLET ORAL NIGHTLY PRN
Qty: 30 TABLET | Refills: 0 | Status: SHIPPED | OUTPATIENT
Start: 2023-08-24 | End: 2023-09-23

## 2023-08-24 NOTE — TELEPHONE ENCOUNTER
From: Sandra Mccollum  To: Angeles Baigcleveland Mondragron  Sent: 8/23/2023 9:31 PM EDT  Subject: New Refill of Ambien    Angeles, I requested a refill of Ambien from [facility]. They are not showing approval from you on the refill. Could you please check your records to see if you received this request? If not, could you please send a prescription for Ambien, 5 mg., to [facility]. Thank you.   Darreld Cool

## 2023-10-02 DIAGNOSIS — G47.09 OTHER INSOMNIA: ICD-10-CM

## 2023-10-03 RX ORDER — ZOLPIDEM TARTRATE 5 MG/1
TABLET ORAL
Qty: 30 TABLET | Refills: 1 | Status: SHIPPED | OUTPATIENT
Start: 2023-10-03 | End: 2023-12-02

## 2023-12-03 DIAGNOSIS — G47.09 OTHER INSOMNIA: ICD-10-CM

## 2023-12-05 DIAGNOSIS — G47.09 OTHER INSOMNIA: Primary | ICD-10-CM

## 2023-12-05 RX ORDER — ZOLPIDEM TARTRATE 5 MG/1
5 TABLET ORAL NIGHTLY PRN
Qty: 60 TABLET | Refills: 0 | Status: SHIPPED | OUTPATIENT
Start: 2023-12-05 | End: 2024-02-03

## 2023-12-08 RX ORDER — ZOLPIDEM TARTRATE 5 MG/1
TABLET ORAL
Qty: 30 TABLET | Refills: 1 | OUTPATIENT
Start: 2023-12-08 | End: 2024-02-06

## 2023-12-26 RX ORDER — LEVALBUTEROL TARTRATE 45 UG/1
AEROSOL, METERED ORAL
Qty: 15 G | Refills: 20 | Status: SHIPPED | OUTPATIENT
Start: 2023-12-26

## 2024-01-08 SDOH — HEALTH STABILITY: PHYSICAL HEALTH: ON AVERAGE, HOW MANY DAYS PER WEEK DO YOU ENGAGE IN MODERATE TO STRENUOUS EXERCISE (LIKE A BRISK WALK)?: 4 DAYS

## 2024-01-08 SDOH — ECONOMIC STABILITY: FOOD INSECURITY: WITHIN THE PAST 12 MONTHS, YOU WORRIED THAT YOUR FOOD WOULD RUN OUT BEFORE YOU GOT MONEY TO BUY MORE.: NEVER TRUE

## 2024-01-08 SDOH — ECONOMIC STABILITY: TRANSPORTATION INSECURITY
IN THE PAST 12 MONTHS, HAS LACK OF TRANSPORTATION KEPT YOU FROM MEETINGS, WORK, OR FROM GETTING THINGS NEEDED FOR DAILY LIVING?: NO

## 2024-01-08 SDOH — ECONOMIC STABILITY: HOUSING INSECURITY
IN THE LAST 12 MONTHS, WAS THERE A TIME WHEN YOU DID NOT HAVE A STEADY PLACE TO SLEEP OR SLEPT IN A SHELTER (INCLUDING NOW)?: NO

## 2024-01-08 SDOH — ECONOMIC STABILITY: FOOD INSECURITY: WITHIN THE PAST 12 MONTHS, THE FOOD YOU BOUGHT JUST DIDN'T LAST AND YOU DIDN'T HAVE MONEY TO GET MORE.: NEVER TRUE

## 2024-01-08 SDOH — ECONOMIC STABILITY: INCOME INSECURITY: HOW HARD IS IT FOR YOU TO PAY FOR THE VERY BASICS LIKE FOOD, HOUSING, MEDICAL CARE, AND HEATING?: NOT HARD AT ALL

## 2024-01-08 SDOH — HEALTH STABILITY: PHYSICAL HEALTH: ON AVERAGE, HOW MANY MINUTES DO YOU ENGAGE IN EXERCISE AT THIS LEVEL?: 30 MIN

## 2024-01-08 ASSESSMENT — PATIENT HEALTH QUESTIONNAIRE - PHQ9
2. FEELING DOWN, DEPRESSED OR HOPELESS: 0
SUM OF ALL RESPONSES TO PHQ QUESTIONS 1-9: 0
SUM OF ALL RESPONSES TO PHQ QUESTIONS 1-9: 0
SUM OF ALL RESPONSES TO PHQ9 QUESTIONS 1 & 2: 0
SUM OF ALL RESPONSES TO PHQ QUESTIONS 1-9: 0
SUM OF ALL RESPONSES TO PHQ QUESTIONS 1-9: 0
1. LITTLE INTEREST OR PLEASURE IN DOING THINGS: 0

## 2024-01-08 ASSESSMENT — LIFESTYLE VARIABLES
HOW MANY STANDARD DRINKS CONTAINING ALCOHOL DO YOU HAVE ON A TYPICAL DAY: 1
HOW OFTEN DO YOU HAVE A DRINK CONTAINING ALCOHOL: 4
HOW MANY STANDARD DRINKS CONTAINING ALCOHOL DO YOU HAVE ON A TYPICAL DAY: 1 OR 2
HOW OFTEN DO YOU HAVE SIX OR MORE DRINKS ON ONE OCCASION: 1
HOW OFTEN DO YOU HAVE A DRINK CONTAINING ALCOHOL: 2-3 TIMES A WEEK

## 2024-01-11 ENCOUNTER — OFFICE VISIT (OUTPATIENT)
Age: 74
End: 2024-01-11
Payer: MEDICARE

## 2024-01-11 VITALS
HEART RATE: 64 BPM | WEIGHT: 137.2 LBS | SYSTOLIC BLOOD PRESSURE: 134 MMHG | HEIGHT: 62 IN | DIASTOLIC BLOOD PRESSURE: 72 MMHG | OXYGEN SATURATION: 96 % | RESPIRATION RATE: 16 BRPM | TEMPERATURE: 97.8 F | BODY MASS INDEX: 25.25 KG/M2

## 2024-01-11 DIAGNOSIS — Z00.00 MEDICARE ANNUAL WELLNESS VISIT, SUBSEQUENT: Primary | ICD-10-CM

## 2024-01-11 DIAGNOSIS — D69.6 THROMBOCYTOPENIA, UNSPECIFIED (HCC): ICD-10-CM

## 2024-01-11 DIAGNOSIS — E55.9 VITAMIN D DEFICIENCY: ICD-10-CM

## 2024-01-11 DIAGNOSIS — M85.80 OSTEOPENIA, UNSPECIFIED LOCATION: ICD-10-CM

## 2024-01-11 DIAGNOSIS — Z85.3 PERSONAL HISTORY OF MALIGNANT NEOPLASM OF BREAST: ICD-10-CM

## 2024-01-11 DIAGNOSIS — E78.00 HYPERCHOLESTEROLEMIA: ICD-10-CM

## 2024-01-11 DIAGNOSIS — G47.09 OTHER INSOMNIA: ICD-10-CM

## 2024-01-11 DIAGNOSIS — F33.0 MILD EPISODE OF RECURRENT MAJOR DEPRESSIVE DISORDER (HCC): ICD-10-CM

## 2024-01-11 DIAGNOSIS — M19.90 ARTHRITIS: ICD-10-CM

## 2024-01-11 DIAGNOSIS — Z23 NEED FOR VACCINATION FOR PNEUMOCOCCUS: ICD-10-CM

## 2024-01-11 PROCEDURE — G8484 FLU IMMUNIZE NO ADMIN: HCPCS | Performed by: NURSE PRACTITIONER

## 2024-01-11 PROCEDURE — 90677 PCV20 VACCINE IM: CPT | Performed by: NURSE PRACTITIONER

## 2024-01-11 PROCEDURE — PBSHW PNEUMOCOCCAL, PCV20, PREVNAR 20, (AGE 6W+), IM, PF: Performed by: NURSE PRACTITIONER

## 2024-01-11 PROCEDURE — G0439 PPPS, SUBSEQ VISIT: HCPCS | Performed by: NURSE PRACTITIONER

## 2024-01-11 PROCEDURE — 1123F ACP DISCUSS/DSCN MKR DOCD: CPT | Performed by: NURSE PRACTITIONER

## 2024-01-11 PROCEDURE — 3017F COLORECTAL CA SCREEN DOC REV: CPT | Performed by: NURSE PRACTITIONER

## 2024-01-11 RX ORDER — YEAST,DRIED (S. CEREVISIAE)
1 POWDER (GRAM) ORAL 2 TIMES DAILY
COMMUNITY

## 2024-01-11 RX ORDER — CETIRIZINE HYDROCHLORIDE 10 MG/1
TABLET ORAL
Qty: 90 TABLET | Refills: 3 | Status: SHIPPED | OUTPATIENT
Start: 2024-01-11

## 2024-01-11 RX ORDER — ZOLPIDEM TARTRATE 5 MG/1
5 TABLET ORAL NIGHTLY PRN
Qty: 90 TABLET | Refills: 0 | Status: SHIPPED | OUTPATIENT
Start: 2024-01-11 | End: 2024-04-10

## 2024-01-11 NOTE — PROGRESS NOTES
tender without mass, no thyromegaly or thyroid nodules, no cervical lymphadenopathy   Pulmonary/Chest: clear to auscultation bilaterally- no wheezes, rales or rhonchi, normal air movement, no respiratory distress  Cardiovascular: normal rate, normal S1 and S2, no gallops, intact distal pulses, and no carotid bruits  Abdomen: soft, non-tender, non-distended, normal bowel sounds, no masses or organomegaly  Extremities: no cyanosis and no clubbing       Allergies   Allergen Reactions    Prednisone Anaphylaxis, Anxiety and Shortness Of Breath     Other reaction(s): anaphylaxis/angioedema  Other reaction(s): anaphylaxis/angioedema      Cefazolin Cough and Itching     Other reaction(s): cough  Other reaction(s): Itching  Other reaction(s): cough  Other reaction(s): Itching  Other reaction(s): Itching  Other reaction(s): Itching      Flunisolide     Guaifenesin     Other Other (See Comments)     Other reaction(s): unknown  Increased liver function    Statins Other (See Comments)     Other reaction(s): unknown  Increased liver function  Increased liver function       Prior to Visit Medications    Medication Sig Taking? Authorizing Provider   cetirizine (ZYRTEC) 10 MG tablet TAKE 1 TABLET DAILY AS NEEDED FOR ALLERGIES Yes Angeles Hernandez APRN - CNP   Glucos-Chond-MSM-Bor-D3-Hyalur ([facility] ADV + D) TABS Take 1 tablet by mouth in the morning and at bedtime Yes Provider, MD Harrison   zolpidem (AMBIEN) 5 MG tablet Take 1 tablet by mouth nightly as needed for Sleep for up to 90 days. Max Daily Amount: 5 mg Yes Angeles Hernandez APRN - CNP   sertraline (ZOLOFT) 50 MG tablet TAKE 1 TABLET DAILY Yes Alzamandres CobosagronAngeles APRN - CNP   XOPENEX HFA 45 MCG/ACT inhaler USE 2 INHALATIONS EVERY 4 HOURS AS NEEDED FOR WHEEZING Yes JovanniamAngeles Humphries APRN - CNP   meloxicam (MOBIC) 15 MG tablet TAKE 1 TABLET DAILY Yes Alzamandres CobosagronAngeles APRN - CNP   fluticasone (FLONASE) 50 MCG/ACT nasal

## 2024-01-11 NOTE — PATIENT INSTRUCTIONS
wear a seat belt when traveling in a car. Always wear a helmet when riding a bicycle or motorcycle.

## 2024-01-12 LAB
ALBUMIN SERPL-MCNC: 4 G/DL (ref 3.5–5)
ALBUMIN/GLOB SERPL: 1.4 (ref 1.1–2.2)
ALP SERPL-CCNC: 65 U/L (ref 45–117)
ALT SERPL-CCNC: 30 U/L (ref 12–78)
ANION GAP SERPL CALC-SCNC: 6 MMOL/L (ref 5–15)
AST SERPL-CCNC: 17 U/L (ref 15–37)
BASOPHILS # BLD: 0 K/UL (ref 0–0.1)
BASOPHILS NFR BLD: 0 % (ref 0–1)
BILIRUB SERPL-MCNC: 0.4 MG/DL (ref 0.2–1)
BUN SERPL-MCNC: 18 MG/DL (ref 6–20)
BUN/CREAT SERPL: 25 (ref 12–20)
CALCIUM SERPL-MCNC: 9.7 MG/DL (ref 8.5–10.1)
CHLORIDE SERPL-SCNC: 107 MMOL/L (ref 97–108)
CO2 SERPL-SCNC: 29 MMOL/L (ref 21–32)
CREAT SERPL-MCNC: 0.73 MG/DL (ref 0.55–1.02)
DIFFERENTIAL METHOD BLD: ABNORMAL
EOSINOPHIL # BLD: 0.2 K/UL (ref 0–0.4)
EOSINOPHIL NFR BLD: 4 % (ref 0–7)
ERYTHROCYTE [DISTWIDTH] IN BLOOD BY AUTOMATED COUNT: 12.1 % (ref 11.5–14.5)
GLOBULIN SER CALC-MCNC: 2.8 G/DL (ref 2–4)
GLUCOSE SERPL-MCNC: 105 MG/DL (ref 65–100)
HCT VFR BLD AUTO: 43.1 % (ref 35–47)
HGB BLD-MCNC: 13.7 G/DL (ref 11.5–16)
IMM GRANULOCYTES # BLD AUTO: 0 K/UL (ref 0–0.04)
IMM GRANULOCYTES NFR BLD AUTO: 0 % (ref 0–0.5)
LYMPHOCYTES # BLD: 1.4 K/UL (ref 0.8–3.5)
LYMPHOCYTES NFR BLD: 26 % (ref 12–49)
MCHC RBC AUTO-ENTMCNC: 31.8 G/DL (ref 30–36.5)
MCV RBC AUTO: 93.7 FL (ref 80–99)
MONOCYTES # BLD: 0.6 K/UL (ref 0–1)
MONOCYTES NFR BLD: 10 % (ref 5–13)
NEUTS SEG # BLD: 3.2 K/UL (ref 1.8–8)
NEUTS SEG NFR BLD: 60 % (ref 32–75)
NRBC # BLD: 0 K/UL (ref 0–0.01)
NRBC BLD-RTO: 0 PER 100 WBC
PLATELET # BLD AUTO: 147 K/UL (ref 150–400)
POTASSIUM SERPL-SCNC: 4 MMOL/L (ref 3.5–5.1)
PROT SERPL-MCNC: 6.8 G/DL (ref 6.4–8.2)
RBC # BLD AUTO: 4.6 M/UL (ref 3.8–5.2)
SODIUM SERPL-SCNC: 142 MMOL/L (ref 136–145)
WBC # BLD AUTO: 5.4 K/UL (ref 3.6–11)

## 2024-02-16 RX ORDER — ATORVASTATIN CALCIUM 10 MG/1
10 TABLET, FILM COATED ORAL DAILY
Qty: 90 TABLET | Refills: 3 | OUTPATIENT
Start: 2024-02-16

## 2024-02-29 ENCOUNTER — PATIENT MESSAGE (OUTPATIENT)
Age: 74
End: 2024-02-29

## 2024-03-01 RX ORDER — ATORVASTATIN CALCIUM 10 MG/1
TABLET, FILM COATED ORAL
Qty: 30 TABLET | Refills: 1 | Status: SHIPPED | OUTPATIENT
Start: 2024-03-01

## 2024-03-01 NOTE — TELEPHONE ENCOUNTER
From: Iraida Alejandre  To: Angeles Bruner  Sent: 2/29/2024 10:40 PM EST  Subject: Prescription Refill    Hi, I am out of my atorvastatin and need a new prescription sent to [facility]. 10 mg/90 day supply.    I only take 5 mg each day, cutting each 10 mg in half, so if they have a 5 mg tablet that would be great.    Thanks much. Hope you are doing well.

## 2024-03-04 RX ORDER — ATORVASTATIN CALCIUM 10 MG/1
TABLET, FILM COATED ORAL
Qty: 30 TABLET | Refills: 1 | Status: SHIPPED | OUTPATIENT
Start: 2024-03-04

## 2024-04-17 ENCOUNTER — PATIENT MESSAGE (OUTPATIENT)
Age: 74
End: 2024-04-17

## 2024-04-18 NOTE — TELEPHONE ENCOUNTER
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     I would appreciate it if you could call in a new prescription for me at the [facility] on [address].    The prescription is for Sertraline Hcl Tabs 50 mg    Thank you.  Iraida Alejandre

## 2024-04-26 DIAGNOSIS — G47.00 INSOMNIA, UNSPECIFIED TYPE: ICD-10-CM

## 2024-04-26 RX ORDER — ZOLPIDEM TARTRATE 5 MG/1
5 TABLET ORAL NIGHTLY PRN
Qty: 60 TABLET | Refills: 0 | Status: SHIPPED | OUTPATIENT
Start: 2024-04-26 | End: 2024-06-25

## 2024-06-21 RX ORDER — ATORVASTATIN CALCIUM 10 MG/1
TABLET, FILM COATED ORAL
Qty: 90 TABLET | Refills: 1 | Status: SHIPPED | OUTPATIENT
Start: 2024-06-21

## 2024-06-23 DIAGNOSIS — G47.00 INSOMNIA, UNSPECIFIED TYPE: ICD-10-CM

## 2024-06-24 RX ORDER — ZOLPIDEM TARTRATE 5 MG/1
TABLET ORAL
Qty: 60 TABLET | Refills: 0 | OUTPATIENT
Start: 2024-06-24

## 2024-07-08 DIAGNOSIS — G47.09 OTHER INSOMNIA: ICD-10-CM

## 2024-07-08 RX ORDER — ZOLPIDEM TARTRATE 5 MG/1
TABLET ORAL
Qty: 90 TABLET | Refills: 0 | Status: SHIPPED | OUTPATIENT
Start: 2024-07-08 | End: 2024-10-08

## 2024-07-11 ENCOUNTER — OFFICE VISIT (OUTPATIENT)
Age: 74
End: 2024-07-11
Payer: MEDICARE

## 2024-07-11 VITALS
HEART RATE: 57 BPM | WEIGHT: 137.6 LBS | OXYGEN SATURATION: 97 % | RESPIRATION RATE: 19 BRPM | DIASTOLIC BLOOD PRESSURE: 79 MMHG | BODY MASS INDEX: 25.32 KG/M2 | SYSTOLIC BLOOD PRESSURE: 134 MMHG | HEIGHT: 62 IN | TEMPERATURE: 97.8 F

## 2024-07-11 DIAGNOSIS — N81.4 UTERINE PROLAPSE: Primary | ICD-10-CM

## 2024-07-11 PROBLEM — G56.00 CARPAL TUNNEL SYNDROME: Status: ACTIVE | Noted: 2024-07-11

## 2024-07-11 PROBLEM — F33.9 RECURRENT DEPRESSION (HCC): Status: ACTIVE | Noted: 2017-02-20

## 2024-07-11 PROBLEM — M18.11 PRIMARY OSTEOARTHRITIS OF FIRST CARPOMETACARPAL JOINT OF RIGHT HAND: Status: ACTIVE | Noted: 2018-06-28

## 2024-07-11 PROBLEM — C77.9 RIGHT BREAST CANCER WITH MALIGNANT CELLS IN REGIONAL LYMPH NODES NO GREATER THAN 0.2 MM AND NO MORE THAN 200 CELLS (HCC): Status: ACTIVE | Noted: 2018-11-29

## 2024-07-11 PROBLEM — C50.911 RIGHT BREAST CANCER WITH MALIGNANT CELLS IN REGIONAL LYMPH NODES NO GREATER THAN 0.2 MM AND NO MORE THAN 200 CELLS (HCC): Status: ACTIVE | Noted: 2018-11-29

## 2024-07-11 PROBLEM — N95.1 SYMPTOMATIC MENOPAUSAL OR FEMALE CLIMACTERIC STATES: Status: ACTIVE | Noted: 2024-07-11

## 2024-07-11 PROBLEM — J30.1 ALLERGIC RHINITIS DUE TO POLLEN: Status: ACTIVE | Noted: 2024-07-11

## 2024-07-11 PROBLEM — R06.89 DIFFICULTY BREATHING: Status: ACTIVE | Noted: 2024-07-11

## 2024-07-11 PROBLEM — M77.40 METATARSALGIA: Status: ACTIVE | Noted: 2024-07-11

## 2024-07-11 PROBLEM — J30.81 ALLERGIC RHINITIS DUE TO ANIMALS: Status: ACTIVE | Noted: 2024-07-11

## 2024-07-11 PROBLEM — N81.10 FEMALE BLADDER PROLAPSE: Status: ACTIVE | Noted: 2024-01-01

## 2024-07-11 PROBLEM — M99.03 SOMATIC DYSFUNCTION OF LUMBOSACRAL REGION: Status: ACTIVE | Noted: 2024-07-11

## 2024-07-11 PROBLEM — M54.50 LUMBAGO: Status: ACTIVE | Noted: 2024-07-11

## 2024-07-11 PROBLEM — D36.13 NEUROMA OF FOOT: Status: ACTIVE | Noted: 2024-07-11

## 2024-07-11 PROBLEM — E66.9 OBESITY: Status: ACTIVE | Noted: 2024-07-11

## 2024-07-11 PROBLEM — F06.4 ANXIETY DISORDER DUE TO GENERAL MEDICAL CONDITION: Status: ACTIVE | Noted: 2024-07-11

## 2024-07-11 PROCEDURE — G8427 DOCREV CUR MEDS BY ELIG CLIN: HCPCS | Performed by: NURSE PRACTITIONER

## 2024-07-11 PROCEDURE — G8400 PT W/DXA NO RESULTS DOC: HCPCS | Performed by: NURSE PRACTITIONER

## 2024-07-11 PROCEDURE — 99213 OFFICE O/P EST LOW 20 MIN: CPT | Performed by: NURSE PRACTITIONER

## 2024-07-11 PROCEDURE — 3017F COLORECTAL CA SCREEN DOC REV: CPT | Performed by: NURSE PRACTITIONER

## 2024-07-11 PROCEDURE — G8419 CALC BMI OUT NRM PARAM NOF/U: HCPCS | Performed by: NURSE PRACTITIONER

## 2024-07-11 PROCEDURE — 1123F ACP DISCUSS/DSCN MKR DOCD: CPT | Performed by: NURSE PRACTITIONER

## 2024-07-11 PROCEDURE — 1036F TOBACCO NON-USER: CPT | Performed by: NURSE PRACTITIONER

## 2024-07-11 PROCEDURE — 1090F PRES/ABSN URINE INCON ASSESS: CPT | Performed by: NURSE PRACTITIONER

## 2024-07-11 ASSESSMENT — ENCOUNTER SYMPTOMS
GASTROINTESTINAL NEGATIVE: 1
COUGH: 0
RHINORRHEA: 0
SHORTNESS OF BREATH: 0
NAUSEA: 0
DIARRHEA: 0
VOMITING: 0
CONSTIPATION: 0
EYE REDNESS: 0
CHEST TIGHTNESS: 0
SINUS PAIN: 0
EYE PAIN: 0
EYES NEGATIVE: 1
ABDOMINAL PAIN: 0
BACK PAIN: 0
SINUS PRESSURE: 0
BLOOD IN STOOL: 0
RESPIRATORY NEGATIVE: 1

## 2024-07-11 NOTE — PROGRESS NOTES
\"Have you been to the ER, urgent care clinic since your last visit?  Hospitalized since your last visit?\"    NO    “Have you seen or consulted any other health care providers outside of [facility] since your last visit?”    YES - When: approximately 6 months ago.  Where and Why: [facility] - Dr. Mijares.            Click Here for Release of Records Request

## 2024-07-11 NOTE — PROGRESS NOTES
Assessment and Plan     1. Uterine prolapse: Symptoms and physical exam consistent with uterine prolapse. Does not present with pain or urinary symptoms. Referred to UROGYN. Return instructions given. Pt verbalized understanding.   -     Adam Melendez MD, Urogynecology, [place] ([address])       Benefits, risks, possible drug interactions, and side effects of all new medications were reviewed with the patient. Pt verbalized understanding.    An electronic signature was used to authenticate this note.  Angeles Hatchfaithon, ALFONSO - CNP  7/11/2024      Follow-up and Dispositions    Return if symptoms worsen or fail to improve.          History of Present Illness   Chief Complaint     Iraida Alejandre is a 73 y.o. female here for had concerns including Gynecologic Exam (Pelvic exam - irritation. ).   Mrs. Alejandre presents today with reports of vaginal prolapse noted about 8-10 months ago. Symptoms had become more noticeable and reports irritation to vaginal area when cleaning. Denies history of uterine or bladder prolapse. Denies history of abdominal or pelvic surgery. Denies urinary symptoms, pelvic pain, vaginal discharge or bleeding.       Review of Systems  Review of Systems   Constitutional: Negative.  Negative for chills, fatigue, fever and unexpected weight change.   HENT: Negative.  Negative for congestion, rhinorrhea, sinus pressure and sinus pain.    Eyes: Negative.  Negative for pain, redness and visual disturbance.   Respiratory: Negative.  Negative for cough, chest tightness and shortness of breath.    Cardiovascular: Negative.  Negative for chest pain and palpitations.   Gastrointestinal: Negative.  Negative for abdominal pain, blood in stool, constipation, diarrhea, nausea and vomiting.   Endocrine: Negative.  Negative for polydipsia, polyphagia and polyuria.   Genitourinary: Negative.  Negative for difficulty urinating, dysuria, frequency, hematuria, menstrual problem, pelvic pain, urgency,

## 2024-07-12 DIAGNOSIS — N81.4 UTERINE PROLAPSE: Primary | ICD-10-CM

## 2024-08-01 RX ORDER — MELATONIN 10 MG
10 CAPSULE ORAL NIGHTLY PRN
Qty: 90 CAPSULE | Refills: 0 | Status: SHIPPED | OUTPATIENT
Start: 2024-08-01 | End: 2024-10-30

## 2024-08-01 RX ORDER — MELOXICAM 15 MG/1
TABLET ORAL
Qty: 90 TABLET | Refills: 3 | OUTPATIENT
Start: 2024-08-01

## 2024-08-02 RX ORDER — FLUTICASONE PROPIONATE 50 MCG
SPRAY, SUSPENSION (ML) NASAL
Qty: 48 G | Refills: 3 | Status: SHIPPED | OUTPATIENT
Start: 2024-08-02

## 2024-09-04 ENCOUNTER — OFFICE VISIT (OUTPATIENT)
Age: 74
End: 2024-09-04
Payer: MEDICARE

## 2024-09-04 VITALS
DIASTOLIC BLOOD PRESSURE: 85 MMHG | OXYGEN SATURATION: 97 % | BODY MASS INDEX: 25.58 KG/M2 | RESPIRATION RATE: 14 BRPM | HEART RATE: 55 BPM | WEIGHT: 139 LBS | HEIGHT: 62 IN | TEMPERATURE: 98.2 F | SYSTOLIC BLOOD PRESSURE: 144 MMHG

## 2024-09-04 DIAGNOSIS — C77.9 RIGHT BREAST CANCER WITH MALIGNANT CELLS IN REGIONAL LYMPH NODES NO GREATER THAN 0.2 MM AND NO MORE THAN 200 CELLS (HCC): ICD-10-CM

## 2024-09-04 DIAGNOSIS — H92.02 OTALGIA OF LEFT EAR: Primary | ICD-10-CM

## 2024-09-04 DIAGNOSIS — C50.911 RIGHT BREAST CANCER WITH MALIGNANT CELLS IN REGIONAL LYMPH NODES NO GREATER THAN 0.2 MM AND NO MORE THAN 200 CELLS (HCC): ICD-10-CM

## 2024-09-04 PROCEDURE — 1036F TOBACCO NON-USER: CPT | Performed by: NURSE PRACTITIONER

## 2024-09-04 PROCEDURE — G8419 CALC BMI OUT NRM PARAM NOF/U: HCPCS | Performed by: NURSE PRACTITIONER

## 2024-09-04 PROCEDURE — 99212 OFFICE O/P EST SF 10 MIN: CPT | Performed by: NURSE PRACTITIONER

## 2024-09-04 PROCEDURE — 3017F COLORECTAL CA SCREEN DOC REV: CPT | Performed by: NURSE PRACTITIONER

## 2024-09-04 PROCEDURE — G8400 PT W/DXA NO RESULTS DOC: HCPCS | Performed by: NURSE PRACTITIONER

## 2024-09-04 PROCEDURE — 1090F PRES/ABSN URINE INCON ASSESS: CPT | Performed by: NURSE PRACTITIONER

## 2024-09-04 PROCEDURE — G8427 DOCREV CUR MEDS BY ELIG CLIN: HCPCS | Performed by: NURSE PRACTITIONER

## 2024-09-04 PROCEDURE — 1123F ACP DISCUSS/DSCN MKR DOCD: CPT | Performed by: NURSE PRACTITIONER

## 2024-09-04 ASSESSMENT — ENCOUNTER SYMPTOMS
EYE REDNESS: 0
NAUSEA: 0
SHORTNESS OF BREATH: 0
SINUS PRESSURE: 0
RHINORRHEA: 0
EYES NEGATIVE: 1
BLOOD IN STOOL: 0
SINUS PAIN: 0
GASTROINTESTINAL NEGATIVE: 1
CHEST TIGHTNESS: 0
RESPIRATORY NEGATIVE: 1
EYE PAIN: 0
COUGH: 0
ABDOMINAL PAIN: 0
DIARRHEA: 0
BACK PAIN: 0
VOMITING: 0
CONSTIPATION: 0

## 2024-09-04 ASSESSMENT — PATIENT HEALTH QUESTIONNAIRE - PHQ9
1. LITTLE INTEREST OR PLEASURE IN DOING THINGS: NOT AT ALL
SUM OF ALL RESPONSES TO PHQ QUESTIONS 1-9: 0
2. FEELING DOWN, DEPRESSED OR HOPELESS: NOT AT ALL
SUM OF ALL RESPONSES TO PHQ QUESTIONS 1-9: 0
SUM OF ALL RESPONSES TO PHQ9 QUESTIONS 1 & 2: 0

## 2024-09-04 NOTE — PROGRESS NOTES
Assessment and Plan     1. Otalgia of left ear: Benign physical exam, symptoms resolved. Return instructions given. Pt verbalized understanding.   2. Right breast cancer with malignant cells in regional lymph nodes no greater than 0.2 mm and no more than 200 cells (HCC): Will continue to follow up with oncologist.   Assessment & Plan:   Monitored by specialist- no acute findings meriting change in the plan       Benefits, risks, possible drug interactions, and side effects of all new medications were reviewed with the patient. Pt verbalized understanding.    An electronic signature was used to authenticate this note.  Angeles Bruner, APRN - CNP  9/4/2024      Follow-up and Dispositions    Return if symptoms worsen or fail to improve.          History of Present Illness   Chief Complaint     Iraida Alejandre is a 73 y.o. female here for had concerns including Otalgia (Was having left ear pain. Ear feels fine now).   Mrs. Alejandre presents today with reports of L ear pain started 3 days ago and lasted for 48 hours. She took Ibuprofen x 3 with symptoms resolution. Denies ear discharge, sore throat, nasal congestion. Last dental exam 4 months ago. Fully asymptomatic       Review of Systems  Review of Systems   Constitutional: Negative.  Negative for chills, fatigue, fever and unexpected weight change.   HENT: Negative.  Negative for congestion, rhinorrhea, sinus pressure and sinus pain.    Eyes: Negative.  Negative for pain, redness and visual disturbance.   Respiratory: Negative.  Negative for cough, chest tightness and shortness of breath.    Cardiovascular: Negative.  Negative for chest pain and palpitations.   Gastrointestinal: Negative.  Negative for abdominal pain, blood in stool, constipation, diarrhea, nausea and vomiting.   Endocrine: Negative.  Negative for polydipsia, polyphagia and polyuria.   Genitourinary: Negative.  Negative for difficulty urinating, dysuria, frequency and urgency.   Musculoskeletal:

## 2024-09-10 DIAGNOSIS — M19.90 ARTHRITIS: Primary | ICD-10-CM

## 2024-09-10 RX ORDER — MELATONIN 10 MG
10 CAPSULE ORAL NIGHTLY PRN
Qty: 90 CAPSULE | Refills: 0 | Status: SHIPPED | OUTPATIENT
Start: 2024-09-10 | End: 2024-12-09

## 2024-09-14 DIAGNOSIS — G47.09 OTHER INSOMNIA: ICD-10-CM

## 2024-09-16 RX ORDER — ZOLPIDEM TARTRATE 5 MG/1
TABLET ORAL
Qty: 90 TABLET | Refills: 0 | OUTPATIENT
Start: 2024-10-09 | End: 2025-01-09

## 2024-09-16 NOTE — TELEPHONE ENCOUNTER
PCP: Angeles Hernandez, APRN - CNP    Last appt: 9/4/2024   No future appointments.    Requested Prescriptions     Pending Prescriptions Disp Refills    zolpidem (AMBIEN) 5 MG tablet [Pharmacy Med Name: ZOLPIDEM TARTRATE TABS 5MG] 90 tablet 0     Sig: TAKE 1 TABLET NIGHTLY AS NEEDED FOR SLEEP (MAXIMUM DAILY AMOUNT 5 MG)     Last ordered 7/8/24    Rx in pending for provider review and approval.    Patient still has one month left on her original 90 day order. Refill to begin on 10/8/2024.     Mita \"Issac\" RASHEED العلي

## 2024-09-29 ENCOUNTER — PATIENT MESSAGE (OUTPATIENT)
Age: 74
End: 2024-09-29

## 2024-09-29 DIAGNOSIS — G47.09 OTHER INSOMNIA: ICD-10-CM

## 2024-09-30 RX ORDER — ZOLPIDEM TARTRATE 5 MG/1
TABLET ORAL
Qty: 90 TABLET | Refills: 0 | Status: SHIPPED | OUTPATIENT
Start: 2024-09-30 | End: 2024-12-31

## 2024-09-30 RX ORDER — CETIRIZINE HYDROCHLORIDE 10 MG/1
10 TABLET ORAL DAILY
Qty: 90 TABLET | Refills: 3 | Status: SHIPPED | OUTPATIENT
Start: 2024-09-30 | End: 2025-09-25

## 2024-10-01 RX ORDER — MELOXICAM 15 MG/1
15 TABLET ORAL DAILY
Qty: 90 TABLET | Refills: 0 | Status: SHIPPED | OUTPATIENT
Start: 2024-10-01

## 2024-10-17 DIAGNOSIS — G56.00 CARPAL TUNNEL SYNDROME, UNSPECIFIED LATERALITY: Primary | ICD-10-CM

## 2024-10-17 DIAGNOSIS — M19.90 ARTHRITIS: ICD-10-CM

## 2024-10-17 DIAGNOSIS — M18.11 PRIMARY OSTEOARTHRITIS OF FIRST CARPOMETACARPAL JOINT OF RIGHT HAND: ICD-10-CM

## 2024-10-18 RX ORDER — MELOXICAM 15 MG/1
15 TABLET ORAL DAILY
Qty: 90 TABLET | Refills: 0 | Status: SHIPPED | OUTPATIENT
Start: 2024-10-18

## 2024-11-29 RX ORDER — ATORVASTATIN CALCIUM 10 MG/1
TABLET, FILM COATED ORAL
Qty: 90 TABLET | Refills: 3 | Status: SHIPPED | OUTPATIENT
Start: 2024-11-29

## 2024-12-12 DIAGNOSIS — G47.09 OTHER INSOMNIA: ICD-10-CM

## 2024-12-13 RX ORDER — ZOLPIDEM TARTRATE 5 MG/1
TABLET ORAL
Qty: 90 TABLET | Refills: 0 | Status: SHIPPED | OUTPATIENT
Start: 2024-12-13 | End: 2025-03-14

## 2025-01-10 SDOH — ECONOMIC STABILITY: INCOME INSECURITY: IN THE LAST 12 MONTHS, WAS THERE A TIME WHEN YOU WERE NOT ABLE TO PAY THE MORTGAGE OR RENT ON TIME?: NO

## 2025-01-10 SDOH — HEALTH STABILITY: PHYSICAL HEALTH: ON AVERAGE, HOW MANY MINUTES DO YOU ENGAGE IN EXERCISE AT THIS LEVEL?: 30 MIN

## 2025-01-10 SDOH — ECONOMIC STABILITY: TRANSPORTATION INSECURITY
IN THE PAST 12 MONTHS, HAS THE LACK OF TRANSPORTATION KEPT YOU FROM MEDICAL APPOINTMENTS OR FROM GETTING MEDICATIONS?: NO

## 2025-01-10 SDOH — HEALTH STABILITY: PHYSICAL HEALTH: ON AVERAGE, HOW MANY DAYS PER WEEK DO YOU ENGAGE IN MODERATE TO STRENUOUS EXERCISE (LIKE A BRISK WALK)?: 4 DAYS

## 2025-01-10 SDOH — ECONOMIC STABILITY: FOOD INSECURITY: WITHIN THE PAST 12 MONTHS, YOU WORRIED THAT YOUR FOOD WOULD RUN OUT BEFORE YOU GOT MONEY TO BUY MORE.: NEVER TRUE

## 2025-01-10 SDOH — ECONOMIC STABILITY: FOOD INSECURITY: WITHIN THE PAST 12 MONTHS, THE FOOD YOU BOUGHT JUST DIDN'T LAST AND YOU DIDN'T HAVE MONEY TO GET MORE.: NEVER TRUE

## 2025-01-10 ASSESSMENT — LIFESTYLE VARIABLES
HOW MANY STANDARD DRINKS CONTAINING ALCOHOL DO YOU HAVE ON A TYPICAL DAY: 1 OR 2
HOW OFTEN DO YOU HAVE A DRINK CONTAINING ALCOHOL: 3
HOW OFTEN DO YOU HAVE A DRINK CONTAINING ALCOHOL: 2-4 TIMES A MONTH
HOW MANY STANDARD DRINKS CONTAINING ALCOHOL DO YOU HAVE ON A TYPICAL DAY: 1
HOW OFTEN DO YOU HAVE SIX OR MORE DRINKS ON ONE OCCASION: 1

## 2025-01-10 ASSESSMENT — PATIENT HEALTH QUESTIONNAIRE - PHQ9
SUM OF ALL RESPONSES TO PHQ QUESTIONS 1-9: 0
SUM OF ALL RESPONSES TO PHQ9 QUESTIONS 1 & 2: 0
SUM OF ALL RESPONSES TO PHQ QUESTIONS 1-9: 0
1. LITTLE INTEREST OR PLEASURE IN DOING THINGS: NOT AT ALL
2. FEELING DOWN, DEPRESSED OR HOPELESS: NOT AT ALL

## 2025-01-13 ENCOUNTER — OFFICE VISIT (OUTPATIENT)
Facility: CLINIC | Age: 75
End: 2025-01-13
Payer: MEDICARE

## 2025-01-13 VITALS
RESPIRATION RATE: 16 BRPM | OXYGEN SATURATION: 95 % | WEIGHT: 136.2 LBS | HEART RATE: 59 BPM | TEMPERATURE: 98.2 F | SYSTOLIC BLOOD PRESSURE: 136 MMHG | HEIGHT: 62 IN | BODY MASS INDEX: 25.06 KG/M2 | DIASTOLIC BLOOD PRESSURE: 88 MMHG

## 2025-01-13 DIAGNOSIS — Z85.3 HISTORY OF BREAST CANCER: ICD-10-CM

## 2025-01-13 DIAGNOSIS — E78.00 HYPERCHOLESTEROLEMIA: ICD-10-CM

## 2025-01-13 DIAGNOSIS — N81.4 UTERINE PROLAPSE: ICD-10-CM

## 2025-01-13 DIAGNOSIS — F33.0 MILD EPISODE OF RECURRENT MAJOR DEPRESSIVE DISORDER (HCC): ICD-10-CM

## 2025-01-13 DIAGNOSIS — Z12.11 SCREENING FOR COLON CANCER: ICD-10-CM

## 2025-01-13 DIAGNOSIS — G47.09 OTHER INSOMNIA: ICD-10-CM

## 2025-01-13 DIAGNOSIS — Z13.1 SCREENING FOR DIABETES MELLITUS: ICD-10-CM

## 2025-01-13 DIAGNOSIS — M85.80 OSTEOPENIA, UNSPECIFIED LOCATION: ICD-10-CM

## 2025-01-13 DIAGNOSIS — Z00.00 MEDICARE ANNUAL WELLNESS VISIT, SUBSEQUENT: Primary | ICD-10-CM

## 2025-01-13 PROCEDURE — 3017F COLORECTAL CA SCREEN DOC REV: CPT | Performed by: NURSE PRACTITIONER

## 2025-01-13 PROCEDURE — 1125F AMNT PAIN NOTED PAIN PRSNT: CPT | Performed by: NURSE PRACTITIONER

## 2025-01-13 PROCEDURE — G0439 PPPS, SUBSEQ VISIT: HCPCS | Performed by: NURSE PRACTITIONER

## 2025-01-13 PROCEDURE — 1123F ACP DISCUSS/DSCN MKR DOCD: CPT | Performed by: NURSE PRACTITIONER

## 2025-01-13 PROCEDURE — 1159F MED LIST DOCD IN RCRD: CPT | Performed by: NURSE PRACTITIONER

## 2025-01-13 PROCEDURE — 1160F RVW MEDS BY RX/DR IN RCRD: CPT | Performed by: NURSE PRACTITIONER

## 2025-01-13 ASSESSMENT — PATIENT HEALTH QUESTIONNAIRE - PHQ9
2. FEELING DOWN, DEPRESSED OR HOPELESS: SEVERAL DAYS
9. THOUGHTS THAT YOU WOULD BE BETTER OFF DEAD, OR OF HURTING YOURSELF: NOT AT ALL
1. LITTLE INTEREST OR PLEASURE IN DOING THINGS: NOT AT ALL
10. IF YOU CHECKED OFF ANY PROBLEMS, HOW DIFFICULT HAVE THESE PROBLEMS MADE IT FOR YOU TO DO YOUR WORK, TAKE CARE OF THINGS AT HOME, OR GET ALONG WITH OTHER PEOPLE: NOT DIFFICULT AT ALL
8. MOVING OR SPEAKING SO SLOWLY THAT OTHER PEOPLE COULD HAVE NOTICED. OR THE OPPOSITE, BEING SO FIGETY OR RESTLESS THAT YOU HAVE BEEN MOVING AROUND A LOT MORE THAN USUAL: NOT AT ALL
3. TROUBLE FALLING OR STAYING ASLEEP: NEARLY EVERY DAY
SUM OF ALL RESPONSES TO PHQ QUESTIONS 1-9: 7
4. FEELING TIRED OR HAVING LITTLE ENERGY: NEARLY EVERY DAY
SUM OF ALL RESPONSES TO PHQ QUESTIONS 1-9: 7
SUM OF ALL RESPONSES TO PHQ9 QUESTIONS 1 & 2: 1
5. POOR APPETITE OR OVEREATING: NOT AT ALL
7. TROUBLE CONCENTRATING ON THINGS, SUCH AS READING THE NEWSPAPER OR WATCHING TELEVISION: NOT AT ALL
6. FEELING BAD ABOUT YOURSELF - OR THAT YOU ARE A FAILURE OR HAVE LET YOURSELF OR YOUR FAMILY DOWN: NOT AT ALL

## 2025-01-13 NOTE — PROGRESS NOTES
\"Have you been to the ER, urgent care clinic since your last visit?  Hospitalized since your last visit?\"    NO    “Have you seen or consulted any other health care providers outside our system since your last visit?”    NO      “Have you had a colorectal cancer screening such as a colonoscopy/FIT/Cologuard?    NO    Date of last Colonoscopy: 8/20/2014  No cologuard on file  No FIT/FOBT on file   No flexible sigmoidoscopy on file

## 2025-01-13 NOTE — PROGRESS NOTES
Medicare Annual Wellness Visit    Iraida Alejandre is here for Medicare AWV (Patient is fasting )    Assessment & Plan   Medicare annual wellness visit, subsequent:  Yearly AWV, dental and vision exams recommended  Lab work ordered  Recommended  vaccines discussed  Colon and breast cancer screening guidelines discussed  Fall prevention discussed  Healthy lifestyle options discussed.     Hypercholesterolemia: Continue with Atorvastatin, will check lipids   -     Lipid Panel; Future  -     Comprehensive Metabolic Panel; Future  Osteopenia, unspecified location: Will have bone density done in March this year, calcium and vitamin D supplements recommended.   -     Comprehensive Metabolic Panel; Future  Other insomnia: Sleep hygiene discussed, continue with Meloxicam   Mild episode of recurrent major depressive disorder (HCC): Stable. Continue with Sertraline   -     CBC; Future  History of breast cancer: Will follow up with oncologist in March for yearly mammogram   Uterine prolapse: Recent hysterectomy, follows up with GYN  Screening for colon cancer  -     Juan Ramon Ballard MD, Gastroenterology, [place]  Screening for diabetes mellitus  -     Comprehensive Metabolic Panel; Future  -     Hemoglobin A1C; Future       Return in 1 year (on 1/13/2026) for Medicare AWV.     Subjective   The following acute and/or chronic problems were also addressed today:  Mrs. Alejandre presents today for AWV. Lives with , she is retired. PMH includes elevated cholesterol, osteopenia, insomnia, depression, OA. Pt had total hysterectomy in October of 2024, follows up with UROGYN, has an appointment next month. Pt is also following up with Dr. Thacker at [facility] due to OA of L hip, she is doing PT and pain management. Denies recent trauma or injuries.     Patient's complete Health Risk Assessment and screening values have been reviewed and are found in Flowsheets. The following problems were reviewed today and where indicated follow up

## 2025-01-13 NOTE — PATIENT INSTRUCTIONS
When you dwell on the past or the future, you miss moments that can heal and strengthen you. You may miss moments like hearing a child laugh or seeing a friendly face when you think you're all alone.  When you're accepting, you don't  the present moment. Instead you accept your thoughts and feelings as they come.  You can practice anytime, anywhere, and in any way you choose. You can practice in many ways. Here are a few ideas:  While doing your chores, like washing the dishes, let your mind focus on what's in your hand. What does the dish feel like? Is the water warm or cold?  Go outside and take a few deep breaths. What is the air like? Is it warm or cold?  When you can, take some time at the start of your day to sit alone and think.  Take a slow walk by yourself. Count your steps while you breathe in and out.  Try yoga breathing exercises, stretches, and poses to strengthen and relax your muscles.  At work, if you can, try to stop for a few moments each hour. Note how your body feels. Let yourself regroup and let your mind settle before you return to what you were doing.  If you struggle with anxiety or \"worry thoughts,\" imagine your mind as a blue nhan and your worry thoughts as clouds. Now imagine those worry thoughts floating across your mind's nhan. Just let them pass by as you watch.  Follow-up care is a key part of your treatment and safety. Be sure to make and go to all appointments, and call your doctor if you are having problems. It's also a good idea to know your test results and keep a list of the medicines you take.  Where can you learn more?  Go to https://www.[employer].net/patientEd and enter M676 to learn more about \"Learning About Mindfulness for Stress.\"  Current as of: July 31, 2024  Content Version: 14.3  © 2024 [employer].   Care instructions adapted under license by [employer]. If you have questions about a medical condition or this instruction, always ask your healthcare

## 2025-01-15 LAB
ALBUMIN SERPL-MCNC: 4 G/DL (ref 3.5–5)
ALBUMIN/GLOB SERPL: 1.6 (ref 1.1–2.2)
ALP SERPL-CCNC: 70 U/L (ref 45–117)
ALT SERPL-CCNC: 29 U/L (ref 12–78)
ANION GAP SERPL CALC-SCNC: 5 MMOL/L (ref 2–12)
AST SERPL-CCNC: 19 U/L (ref 15–37)
BILIRUB SERPL-MCNC: 0.4 MG/DL (ref 0.2–1)
BUN SERPL-MCNC: 13 MG/DL (ref 6–20)
BUN/CREAT SERPL: 20 (ref 12–20)
CALCIUM SERPL-MCNC: 9.4 MG/DL (ref 8.5–10.1)
CHLORIDE SERPL-SCNC: 107 MMOL/L (ref 97–108)
CHOLEST SERPL-MCNC: 186 MG/DL
CO2 SERPL-SCNC: 30 MMOL/L (ref 21–32)
CREAT SERPL-MCNC: 0.66 MG/DL (ref 0.55–1.02)
ERYTHROCYTE [DISTWIDTH] IN BLOOD BY AUTOMATED COUNT: 12.3 % (ref 11.5–14.5)
EST. AVERAGE GLUCOSE BLD GHB EST-MCNC: 126 MG/DL
GLOBULIN SER CALC-MCNC: 2.5 G/DL (ref 2–4)
GLUCOSE SERPL-MCNC: 107 MG/DL (ref 65–100)
HBA1C MFR BLD: 6 % (ref 4–5.6)
HCT VFR BLD AUTO: 41.2 % (ref 35–47)
HDLC SERPL-MCNC: 63 MG/DL
HDLC SERPL: 3 (ref 0–5)
HGB BLD-MCNC: 13.3 G/DL (ref 11.5–16)
LDLC SERPL CALC-MCNC: 92.8 MG/DL (ref 0–100)
MCH RBC QN AUTO: 29.7 PG (ref 26–34)
MCHC RBC AUTO-ENTMCNC: 32.3 G/DL (ref 30–36.5)
MCV RBC AUTO: 92 FL (ref 80–99)
NRBC # BLD: 0 K/UL (ref 0–0.01)
NRBC BLD-RTO: 0 PER 100 WBC
PLATELET # BLD AUTO: 169 K/UL (ref 150–400)
POTASSIUM SERPL-SCNC: 4.1 MMOL/L (ref 3.5–5.1)
PROT SERPL-MCNC: 6.5 G/DL (ref 6.4–8.2)
RBC # BLD AUTO: 4.48 M/UL (ref 3.8–5.2)
SODIUM SERPL-SCNC: 142 MMOL/L (ref 136–145)
TRIGL SERPL-MCNC: 151 MG/DL
VLDLC SERPL CALC-MCNC: 30.2 MG/DL
WBC # BLD AUTO: 4.2 K/UL (ref 3.6–11)

## 2025-02-13 DIAGNOSIS — M18.11 PRIMARY OSTEOARTHRITIS OF FIRST CARPOMETACARPAL JOINT OF RIGHT HAND: ICD-10-CM

## 2025-02-13 DIAGNOSIS — G56.00 CARPAL TUNNEL SYNDROME, UNSPECIFIED LATERALITY: ICD-10-CM

## 2025-02-13 DIAGNOSIS — M19.90 ARTHRITIS: ICD-10-CM

## 2025-02-16 RX ORDER — MELOXICAM 15 MG/1
15 TABLET ORAL DAILY
Qty: 90 TABLET | Refills: 3 | Status: SHIPPED | OUTPATIENT
Start: 2025-02-16

## 2025-03-11 DIAGNOSIS — G47.09 OTHER INSOMNIA: ICD-10-CM

## 2025-03-13 RX ORDER — ZOLPIDEM TARTRATE 5 MG/1
TABLET ORAL
Qty: 90 TABLET | Refills: 0 | Status: SHIPPED | OUTPATIENT
Start: 2025-03-13 | End: 2025-06-10

## 2025-03-14 ENCOUNTER — OFFICE VISIT (OUTPATIENT)
Facility: CLINIC | Age: 75
End: 2025-03-14
Payer: MEDICARE

## 2025-03-14 VITALS
BODY MASS INDEX: 24.8 KG/M2 | HEIGHT: 62 IN | TEMPERATURE: 98.2 F | WEIGHT: 134.8 LBS | SYSTOLIC BLOOD PRESSURE: 136 MMHG | HEART RATE: 62 BPM | DIASTOLIC BLOOD PRESSURE: 84 MMHG | OXYGEN SATURATION: 98 % | RESPIRATION RATE: 16 BRPM

## 2025-03-14 DIAGNOSIS — J40 BRONCHITIS: Primary | ICD-10-CM

## 2025-03-14 PROCEDURE — 1159F MED LIST DOCD IN RCRD: CPT | Performed by: NURSE PRACTITIONER

## 2025-03-14 PROCEDURE — 1090F PRES/ABSN URINE INCON ASSESS: CPT | Performed by: NURSE PRACTITIONER

## 2025-03-14 PROCEDURE — G8427 DOCREV CUR MEDS BY ELIG CLIN: HCPCS | Performed by: NURSE PRACTITIONER

## 2025-03-14 PROCEDURE — G8420 CALC BMI NORM PARAMETERS: HCPCS | Performed by: NURSE PRACTITIONER

## 2025-03-14 PROCEDURE — 1123F ACP DISCUSS/DSCN MKR DOCD: CPT | Performed by: NURSE PRACTITIONER

## 2025-03-14 PROCEDURE — G8400 PT W/DXA NO RESULTS DOC: HCPCS | Performed by: NURSE PRACTITIONER

## 2025-03-14 PROCEDURE — 99213 OFFICE O/P EST LOW 20 MIN: CPT | Performed by: NURSE PRACTITIONER

## 2025-03-14 PROCEDURE — 3017F COLORECTAL CA SCREEN DOC REV: CPT | Performed by: NURSE PRACTITIONER

## 2025-03-14 PROCEDURE — 1160F RVW MEDS BY RX/DR IN RCRD: CPT | Performed by: NURSE PRACTITIONER

## 2025-03-14 PROCEDURE — 1036F TOBACCO NON-USER: CPT | Performed by: NURSE PRACTITIONER

## 2025-03-14 RX ORDER — BENZONATATE 100 MG/1
100 CAPSULE ORAL 3 TIMES DAILY PRN
Qty: 30 CAPSULE | Refills: 0 | Status: SHIPPED | OUTPATIENT
Start: 2025-03-14 | End: 2025-03-24

## 2025-03-14 RX ORDER — AZITHROMYCIN 250 MG/1
TABLET, FILM COATED ORAL
Qty: 6 TABLET | Refills: 0 | Status: SHIPPED | OUTPATIENT
Start: 2025-03-14 | End: 2025-03-24

## 2025-03-14 ASSESSMENT — ENCOUNTER SYMPTOMS
SHORTNESS OF BREATH: 0
NAUSEA: 0
EYE REDNESS: 0
EYES NEGATIVE: 1
VOMITING: 0
SINUS PRESSURE: 1
DIARRHEA: 0
WHEEZING: 0
RHINORRHEA: 1
BLOOD IN STOOL: 0
BACK PAIN: 0
COUGH: 1
ABDOMINAL PAIN: 0
EYE PAIN: 0
SINUS PAIN: 0
CONSTIPATION: 0
CHEST TIGHTNESS: 0
SORE THROAT: 0
GASTROINTESTINAL NEGATIVE: 1

## 2025-03-14 NOTE — PROGRESS NOTES
Assessment and Plan     1. Bronchitis: Continue using Flonase nasal spray. Rx for abx and cough suppressant given, mode of use discussed. Tylenol for pain and fever as needed recommended. Pt agreed with plan.  -     benzonatate (TESSALON) 100 MG capsule; Take 1 capsule by mouth 3 times daily as needed for Cough, Disp-30 capsule, R-0Normal  -     azithromycin (ZITHROMAX) 250 MG tablet; 500mg on day 1 followed by 250mg on days 2 - 5, Disp-6 tablet, R-0Normal       Benefits, risks, possible drug interactions, and side effects of all new medications were reviewed with the patient. Pt verbalized understanding.    An electronic signature was used to authenticate this note.  Angeles rBuner, LAFONSO - CNP  3/16/2025      Follow-up and Dispositions    Return if symptoms worsen or fail to improve.          History of Present Illness   Chief Complaint     Iraida Alejandre is a 74 y.o. female here for had concerns including Cold Symptoms.   Mrs. Alejandre presents today with reports of chest congestion, dry cough, chills, sinus congestion and pressure for 10 days. Tested negative for COVID-19 and influenza at home yesterday. Takes OTC medications including cough medication and Flonase to manage symptoms with no improvement. Denies sick contacts. Denies fever, shortness of breath, sore throat, body aches.     Review of Systems  Review of Systems   Constitutional: Negative.  Negative for chills, fatigue, fever and unexpected weight change.   HENT:  Positive for rhinorrhea and sinus pressure. Negative for congestion, postnasal drip, sinus pain, sore throat and tinnitus.    Eyes: Negative.  Negative for pain, redness and visual disturbance.   Respiratory:  Positive for cough. Negative for chest tightness, shortness of breath and wheezing.    Cardiovascular: Negative.  Negative for chest pain and palpitations.   Gastrointestinal: Negative.  Negative for abdominal pain, blood in stool, constipation, diarrhea, nausea and vomiting.

## 2025-04-01 DIAGNOSIS — J40 BRONCHITIS: Primary | ICD-10-CM

## 2025-04-01 RX ORDER — ALBUTEROL SULFATE 90 UG/1
2 INHALANT RESPIRATORY (INHALATION) 4 TIMES DAILY PRN
Qty: 18 G | Refills: 0 | Status: SHIPPED | OUTPATIENT
Start: 2025-04-01 | End: 2025-04-01

## 2025-04-09 ENCOUNTER — OFFICE VISIT (OUTPATIENT)
Facility: CLINIC | Age: 75
End: 2025-04-09
Payer: MEDICARE

## 2025-04-09 VITALS
BODY MASS INDEX: 25.25 KG/M2 | HEART RATE: 78 BPM | WEIGHT: 137.2 LBS | TEMPERATURE: 97.9 F | RESPIRATION RATE: 14 BRPM | HEIGHT: 62 IN | OXYGEN SATURATION: 100 % | DIASTOLIC BLOOD PRESSURE: 64 MMHG | SYSTOLIC BLOOD PRESSURE: 128 MMHG

## 2025-04-09 DIAGNOSIS — J45.20 MILD INTERMITTENT ASTHMA WITHOUT COMPLICATION: ICD-10-CM

## 2025-04-09 DIAGNOSIS — Z91.09 ENVIRONMENTAL ALLERGIES: Primary | ICD-10-CM

## 2025-04-09 PROCEDURE — 1090F PRES/ABSN URINE INCON ASSESS: CPT | Performed by: NURSE PRACTITIONER

## 2025-04-09 PROCEDURE — 99213 OFFICE O/P EST LOW 20 MIN: CPT | Performed by: NURSE PRACTITIONER

## 2025-04-09 PROCEDURE — G8419 CALC BMI OUT NRM PARAM NOF/U: HCPCS | Performed by: NURSE PRACTITIONER

## 2025-04-09 PROCEDURE — 1036F TOBACCO NON-USER: CPT | Performed by: NURSE PRACTITIONER

## 2025-04-09 PROCEDURE — 1159F MED LIST DOCD IN RCRD: CPT | Performed by: NURSE PRACTITIONER

## 2025-04-09 PROCEDURE — G8400 PT W/DXA NO RESULTS DOC: HCPCS | Performed by: NURSE PRACTITIONER

## 2025-04-09 PROCEDURE — 3017F COLORECTAL CA SCREEN DOC REV: CPT | Performed by: NURSE PRACTITIONER

## 2025-04-09 PROCEDURE — 1123F ACP DISCUSS/DSCN MKR DOCD: CPT | Performed by: NURSE PRACTITIONER

## 2025-04-09 PROCEDURE — G8427 DOCREV CUR MEDS BY ELIG CLIN: HCPCS | Performed by: NURSE PRACTITIONER

## 2025-04-09 PROCEDURE — 1160F RVW MEDS BY RX/DR IN RCRD: CPT | Performed by: NURSE PRACTITIONER

## 2025-04-09 RX ORDER — LEVALBUTEROL TARTRATE 45 UG/1
2 AEROSOL, METERED ORAL EVERY 4 HOURS PRN
Qty: 15 G | Refills: 2 | Status: SHIPPED | OUTPATIENT
Start: 2025-04-09

## 2025-04-09 ASSESSMENT — ENCOUNTER SYMPTOMS
VOMITING: 0
SINUS PRESSURE: 0
COUGH: 0
RHINORRHEA: 0
SINUS PAIN: 0
BACK PAIN: 0
BLOOD IN STOOL: 0
SHORTNESS OF BREATH: 0
EYE REDNESS: 0
RESPIRATORY NEGATIVE: 1
EYES NEGATIVE: 1
EYE PAIN: 0
NAUSEA: 0
CONSTIPATION: 0
CHEST TIGHTNESS: 0
GASTROINTESTINAL NEGATIVE: 1
ABDOMINAL PAIN: 0
DIARRHEA: 0

## 2025-04-09 ASSESSMENT — PATIENT HEALTH QUESTIONNAIRE - PHQ9
SUM OF ALL RESPONSES TO PHQ QUESTIONS 1-9: 0
2. FEELING DOWN, DEPRESSED OR HOPELESS: NOT AT ALL
SUM OF ALL RESPONSES TO PHQ QUESTIONS 1-9: 0
1. LITTLE INTEREST OR PLEASURE IN DOING THINGS: NOT AT ALL
SUM OF ALL RESPONSES TO PHQ QUESTIONS 1-9: 0
SUM OF ALL RESPONSES TO PHQ QUESTIONS 1-9: 0

## 2025-04-09 NOTE — PROGRESS NOTES
Assessment and Plan     1. Environmental allergies: Continue with Zyrtec daily and Flonase nasal spray. Benign physical exam, symptoms improvement.   -     levalbuterol (XOPENEX HFA) 45 MCG/ACT inhaler; Inhale 2 puffs into the lungs every 4 hours as needed for Wheezing, Disp-15 g, R-2Normal  2. Mild intermittent asthma without complication: Lungs clear to auscultation. Continue with Xopenex as needed. Recommended LABA inhaler, pt declined.   -     levalbuterol (XOPENEX HFA) 45 MCG/ACT inhaler; Inhale 2 puffs into the lungs every 4 hours as needed for Wheezing, Disp-15 g, R-2Normal       Benefits, risks, possible drug interactions, and side effects of all new medications were reviewed with the patient. Pt verbalized understanding.    An electronic signature was used to authenticate this note.  Angeles Bruner, APRN - CNP  4/9/2025      Follow-up and Dispositions    Return if symptoms worsen or fail to improve.          History of Present Illness   Chief Complaint     Iraida Alejandre is a 74 y.o. female here for had concerns including Follow-up (Congestion is improving).   Mrs. Alejandre presents today for follow up on bronchitis. Pt was seen 03/16/2025 due due to cough, chest congestion, chills. Pt was started on Z-pack which she completed with compliance. Pt reports she continues to have coughing sensation but she does not have any cough. Admits chills and nasal congestion have resolved. PMH includes asthma, allergic rhinitis, GERD, osteopenia. Takes PO antihistamine, Flonase nasal spray and Xopenex inhaler as needed. Denies fever, chills, shortness of breath.     Review of Systems  Review of Systems   Constitutional: Negative.  Negative for chills, fatigue, fever and unexpected weight change.   HENT: Negative.  Negative for congestion, ear discharge, ear pain, rhinorrhea, sinus pressure and sinus pain.    Eyes: Negative.  Negative for pain, redness and visual disturbance.   Respiratory: Negative.  Negative for

## 2025-06-16 DIAGNOSIS — G47.00 INSOMNIA, UNSPECIFIED TYPE: ICD-10-CM

## 2025-06-16 RX ORDER — ZOLPIDEM TARTRATE 5 MG/1
5 TABLET ORAL NIGHTLY PRN
Qty: 60 TABLET | Refills: 0 | Status: SHIPPED | OUTPATIENT
Start: 2025-06-16 | End: 2025-08-15

## 2025-08-29 ENCOUNTER — PATIENT MESSAGE (OUTPATIENT)
Facility: CLINIC | Age: 75
End: 2025-08-29

## 2025-08-29 DIAGNOSIS — G47.09 OTHER INSOMNIA: Primary | ICD-10-CM

## 2025-09-02 RX ORDER — ZOLPIDEM TARTRATE 5 MG/1
5 TABLET ORAL NIGHTLY PRN
Qty: 30 TABLET | Refills: 0 | Status: SHIPPED | OUTPATIENT
Start: 2025-09-02 | End: 2025-10-02